# Patient Record
Sex: MALE | Race: WHITE | NOT HISPANIC OR LATINO | Employment: OTHER | URBAN - METROPOLITAN AREA
[De-identification: names, ages, dates, MRNs, and addresses within clinical notes are randomized per-mention and may not be internally consistent; named-entity substitution may affect disease eponyms.]

---

## 2025-04-12 ENCOUNTER — HOSPITAL ENCOUNTER (INPATIENT)
Facility: HOSPITAL | Age: 75
DRG: 682 | End: 2025-04-12
Attending: STUDENT IN AN ORGANIZED HEALTH CARE EDUCATION/TRAINING PROGRAM | Admitting: INTERNAL MEDICINE
Payer: MEDICARE

## 2025-04-12 ENCOUNTER — APPOINTMENT (OUTPATIENT)
Dept: RADIOLOGY | Facility: HOSPITAL | Age: 75
DRG: 682 | End: 2025-04-12
Payer: MEDICARE

## 2025-04-12 ENCOUNTER — APPOINTMENT (OUTPATIENT)
Dept: CARDIOLOGY | Facility: HOSPITAL | Age: 75
DRG: 682 | End: 2025-04-12
Payer: MEDICARE

## 2025-04-12 DIAGNOSIS — N17.9 AKI (ACUTE KIDNEY INJURY): Primary | ICD-10-CM

## 2025-04-12 DIAGNOSIS — R19.7 DIARRHEA, UNSPECIFIED TYPE: ICD-10-CM

## 2025-04-12 PROBLEM — E86.1 HYPOVOLEMIA: Status: ACTIVE | Noted: 2025-04-12

## 2025-04-12 LAB
ALBUMIN SERPL BCP-MCNC: 5.4 G/DL (ref 3.4–5)
ALP SERPL-CCNC: 125 U/L (ref 33–136)
ALT SERPL W P-5'-P-CCNC: 51 U/L (ref 10–52)
ANION GAP BLDV CALCULATED.4IONS-SCNC: 19 MMOL/L (ref 10–25)
ANION GAP SERPL CALC-SCNC: 32 MMOL/L (ref 10–20)
AST SERPL W P-5'-P-CCNC: 67 U/L (ref 9–39)
BASE EXCESS BLDV CALC-SCNC: -9.7 MMOL/L (ref -2–3)
BASOPHILS # BLD AUTO: 0.03 X10*3/UL (ref 0–0.1)
BASOPHILS NFR BLD AUTO: 0.2 %
BILIRUB SERPL-MCNC: 0.8 MG/DL (ref 0–1.2)
BODY TEMPERATURE: 37 DEGREES CELSIUS
BUN SERPL-MCNC: 71 MG/DL (ref 6–23)
CA-I BLDV-SCNC: 0.96 MMOL/L (ref 1.1–1.33)
CALCIUM SERPL-MCNC: 10.5 MG/DL (ref 8.6–10.3)
CHLORIDE BLDV-SCNC: 103 MMOL/L (ref 98–107)
CHLORIDE SERPL-SCNC: 95 MMOL/L (ref 98–107)
CO2 SERPL-SCNC: 13 MMOL/L (ref 21–32)
CREAT SERPL-MCNC: 5.88 MG/DL (ref 0.5–1.3)
CRITICAL CALL TIME: 2006
CRITICAL CALLED BY: ABNORMAL
CRITICAL CALLED TO: ABNORMAL
CRITICAL READ BACK: ABNORMAL
EGFRCR SERPLBLD CKD-EPI 2021: 9 ML/MIN/1.73M*2
EOSINOPHIL # BLD AUTO: 0.01 X10*3/UL (ref 0–0.4)
EOSINOPHIL NFR BLD AUTO: 0.1 %
ERYTHROCYTE [DISTWIDTH] IN BLOOD BY AUTOMATED COUNT: 13.3 % (ref 11.5–14.5)
GLUCOSE BLDV-MCNC: 114 MG/DL (ref 74–99)
GLUCOSE SERPL-MCNC: 139 MG/DL (ref 74–99)
HCO3 BLDV-SCNC: 13.3 MMOL/L (ref 22–26)
HCT VFR BLD AUTO: 52.1 % (ref 41–52)
HCT VFR BLD EST: 47 % (ref 41–52)
HGB BLD-MCNC: 16.4 G/DL (ref 13.5–17.5)
HGB BLDV-MCNC: 15.8 G/DL (ref 13.5–17.5)
HOLD SPECIMEN: NORMAL
IMM GRANULOCYTES # BLD AUTO: 0.11 X10*3/UL (ref 0–0.5)
IMM GRANULOCYTES NFR BLD AUTO: 0.7 % (ref 0–0.9)
INHALED O2 CONCENTRATION: 21 %
LACTATE BLDV-SCNC: 2.2 MMOL/L (ref 0.4–2)
LIPASE SERPL-CCNC: 45 U/L (ref 9–82)
LYMPHOCYTES # BLD AUTO: 0.99 X10*3/UL (ref 0.8–3)
LYMPHOCYTES NFR BLD AUTO: 6.6 %
MAGNESIUM SERPL-MCNC: 2.18 MG/DL (ref 1.6–2.4)
MCH RBC QN AUTO: 30.7 PG (ref 26–34)
MCHC RBC AUTO-ENTMCNC: 31.5 G/DL (ref 32–36)
MCV RBC AUTO: 97 FL (ref 80–100)
MONOCYTES # BLD AUTO: 1.49 X10*3/UL (ref 0.05–0.8)
MONOCYTES NFR BLD AUTO: 9.9 %
NEUTROPHILS # BLD AUTO: 12.39 X10*3/UL (ref 1.6–5.5)
NEUTROPHILS NFR BLD AUTO: 82.5 %
NRBC BLD-RTO: 0 /100 WBCS (ref 0–0)
OXYHGB MFR BLDV: 91.9 % (ref 45–75)
PCO2 BLDV: 23 MM HG (ref 41–51)
PH BLDV: 7.37 PH (ref 7.33–7.43)
PLATELET # BLD AUTO: 296 X10*3/UL (ref 150–450)
PO2 BLDV: 69 MM HG (ref 35–45)
POTASSIUM BLDV-SCNC: 8.1 MMOL/L (ref 3.5–5.3)
POTASSIUM SERPL-SCNC: 4.5 MMOL/L (ref 3.5–5.3)
PROT SERPL-MCNC: 9.8 G/DL (ref 6.4–8.2)
RBC # BLD AUTO: 5.35 X10*6/UL (ref 4.5–5.9)
SAO2 % BLDV: 96 % (ref 45–75)
SODIUM BLDV-SCNC: 127 MMOL/L (ref 136–145)
SODIUM SERPL-SCNC: 135 MMOL/L (ref 136–145)
WBC # BLD AUTO: 15 X10*3/UL (ref 4.4–11.3)

## 2025-04-12 PROCEDURE — 83735 ASSAY OF MAGNESIUM: CPT | Performed by: STUDENT IN AN ORGANIZED HEALTH CARE EDUCATION/TRAINING PROGRAM

## 2025-04-12 PROCEDURE — 82435 ASSAY OF BLOOD CHLORIDE: CPT | Performed by: STUDENT IN AN ORGANIZED HEALTH CARE EDUCATION/TRAINING PROGRAM

## 2025-04-12 PROCEDURE — 80053 COMPREHEN METABOLIC PANEL: CPT | Performed by: STUDENT IN AN ORGANIZED HEALTH CARE EDUCATION/TRAINING PROGRAM

## 2025-04-12 PROCEDURE — 99223 1ST HOSP IP/OBS HIGH 75: CPT | Performed by: INTERNAL MEDICINE

## 2025-04-12 PROCEDURE — 96360 HYDRATION IV INFUSION INIT: CPT

## 2025-04-12 PROCEDURE — 36415 COLL VENOUS BLD VENIPUNCTURE: CPT | Performed by: STUDENT IN AN ORGANIZED HEALTH CARE EDUCATION/TRAINING PROGRAM

## 2025-04-12 PROCEDURE — 85025 COMPLETE CBC W/AUTO DIFF WBC: CPT | Performed by: STUDENT IN AN ORGANIZED HEALTH CARE EDUCATION/TRAINING PROGRAM

## 2025-04-12 PROCEDURE — 74176 CT ABD & PELVIS W/O CONTRAST: CPT | Performed by: RADIOLOGY

## 2025-04-12 PROCEDURE — 74176 CT ABD & PELVIS W/O CONTRAST: CPT

## 2025-04-12 PROCEDURE — 2500000004 HC RX 250 GENERAL PHARMACY W/ HCPCS (ALT 636 FOR OP/ED): Performed by: STUDENT IN AN ORGANIZED HEALTH CARE EDUCATION/TRAINING PROGRAM

## 2025-04-12 PROCEDURE — 96361 HYDRATE IV INFUSION ADD-ON: CPT

## 2025-04-12 PROCEDURE — 99285 EMERGENCY DEPT VISIT HI MDM: CPT | Mod: 25 | Performed by: STUDENT IN AN ORGANIZED HEALTH CARE EDUCATION/TRAINING PROGRAM

## 2025-04-12 PROCEDURE — 2060000001 HC INTERMEDIATE ICU ROOM DAILY

## 2025-04-12 PROCEDURE — 83690 ASSAY OF LIPASE: CPT | Performed by: STUDENT IN AN ORGANIZED HEALTH CARE EDUCATION/TRAINING PROGRAM

## 2025-04-12 PROCEDURE — 93005 ELECTROCARDIOGRAM TRACING: CPT

## 2025-04-12 RX ORDER — TAMSULOSIN HYDROCHLORIDE 0.4 MG/1
0.4 CAPSULE ORAL EVERY MORNING
Status: DISCONTINUED | OUTPATIENT
Start: 2025-04-13 | End: 2025-04-19 | Stop reason: HOSPADM

## 2025-04-12 RX ORDER — OMEPRAZOLE 20 MG/1
20 CAPSULE, DELAYED RELEASE ORAL NIGHTLY
COMMUNITY

## 2025-04-12 RX ORDER — ARIPIPRAZOLE 2 MG/1
2 TABLET ORAL DAILY
COMMUNITY

## 2025-04-12 RX ORDER — ARIPIPRAZOLE 2 MG/1
2 TABLET ORAL DAILY
Status: DISCONTINUED | OUTPATIENT
Start: 2025-04-13 | End: 2025-04-19 | Stop reason: HOSPADM

## 2025-04-12 RX ORDER — TAMSULOSIN HYDROCHLORIDE 0.4 MG/1
0.4 CAPSULE ORAL EVERY MORNING
COMMUNITY

## 2025-04-12 RX ORDER — SODIUM CHLORIDE, SODIUM LACTATE, POTASSIUM CHLORIDE, CALCIUM CHLORIDE 600; 310; 30; 20 MG/100ML; MG/100ML; MG/100ML; MG/100ML
125 INJECTION, SOLUTION INTRAVENOUS CONTINUOUS
Status: ACTIVE | OUTPATIENT
Start: 2025-04-12 | End: 2025-04-14

## 2025-04-12 RX ORDER — ERGOCALCIFEROL 1.25 MG/1
1.25 CAPSULE ORAL 2 TIMES WEEKLY
COMMUNITY

## 2025-04-12 RX ORDER — HEPARIN SODIUM 5000 [USP'U]/ML
5000 INJECTION, SOLUTION INTRAVENOUS; SUBCUTANEOUS EVERY 8 HOURS SCHEDULED
Status: DISCONTINUED | OUTPATIENT
Start: 2025-04-12 | End: 2025-04-19 | Stop reason: HOSPADM

## 2025-04-12 RX ORDER — ACETAMINOPHEN 650 MG/1
650 SUPPOSITORY RECTAL EVERY 4 HOURS PRN
Status: DISCONTINUED | OUTPATIENT
Start: 2025-04-12 | End: 2025-04-19 | Stop reason: HOSPADM

## 2025-04-12 RX ORDER — ACETAMINOPHEN 325 MG/1
650 TABLET ORAL EVERY 4 HOURS PRN
Status: DISCONTINUED | OUTPATIENT
Start: 2025-04-12 | End: 2025-04-19 | Stop reason: HOSPADM

## 2025-04-12 RX ORDER — MIRTAZAPINE 15 MG/1
15 TABLET, FILM COATED ORAL NIGHTLY
Status: DISCONTINUED | OUTPATIENT
Start: 2025-04-12 | End: 2025-04-19 | Stop reason: HOSPADM

## 2025-04-12 RX ORDER — ACETAMINOPHEN 160 MG/5ML
650 SOLUTION ORAL EVERY 4 HOURS PRN
Status: DISCONTINUED | OUTPATIENT
Start: 2025-04-12 | End: 2025-04-19 | Stop reason: HOSPADM

## 2025-04-12 RX ORDER — PANTOPRAZOLE SODIUM 40 MG/1
40 TABLET, DELAYED RELEASE ORAL
Status: DISCONTINUED | OUTPATIENT
Start: 2025-04-13 | End: 2025-04-19 | Stop reason: HOSPADM

## 2025-04-12 RX ORDER — PAROXETINE HYDROCHLORIDE 40 MG/1
40 TABLET, FILM COATED ORAL EVERY MORNING
COMMUNITY
Start: 2024-11-27

## 2025-04-12 RX ORDER — VIT C/E/ZN/COPPR/LUTEIN/ZEAXAN 250MG-90MG
1 CAPSULE ORAL 2 TIMES WEEKLY
COMMUNITY

## 2025-04-12 RX ORDER — ONDANSETRON HYDROCHLORIDE 2 MG/ML
4 INJECTION, SOLUTION INTRAVENOUS EVERY 8 HOURS PRN
Status: DISCONTINUED | OUTPATIENT
Start: 2025-04-12 | End: 2025-04-19 | Stop reason: HOSPADM

## 2025-04-12 RX ORDER — PAROXETINE HYDROCHLORIDE 20 MG/1
40 TABLET, FILM COATED ORAL EVERY MORNING
Status: DISCONTINUED | OUTPATIENT
Start: 2025-04-13 | End: 2025-04-19 | Stop reason: HOSPADM

## 2025-04-12 RX ORDER — MIRTAZAPINE 15 MG/1
15 TABLET, FILM COATED ORAL NIGHTLY
COMMUNITY

## 2025-04-12 RX ORDER — ONDANSETRON 4 MG/1
4 TABLET, FILM COATED ORAL EVERY 8 HOURS PRN
Status: DISCONTINUED | OUTPATIENT
Start: 2025-04-12 | End: 2025-04-19 | Stop reason: HOSPADM

## 2025-04-12 RX ADMIN — SODIUM CHLORIDE, SODIUM LACTATE, POTASSIUM CHLORIDE, AND CALCIUM CHLORIDE 1000 ML: .6; .31; .03; .02 INJECTION, SOLUTION INTRAVENOUS at 16:29

## 2025-04-12 RX ADMIN — SODIUM CHLORIDE, SODIUM LACTATE, POTASSIUM CHLORIDE, AND CALCIUM CHLORIDE 1000 ML: .6; .31; .03; .02 INJECTION, SOLUTION INTRAVENOUS at 19:58

## 2025-04-12 SDOH — SOCIAL STABILITY: SOCIAL INSECURITY: DO YOU FEEL UNSAFE GOING BACK TO THE PLACE WHERE YOU ARE LIVING?: NO

## 2025-04-12 SDOH — ECONOMIC STABILITY: HOUSING INSECURITY: IN THE LAST 12 MONTHS, WAS THERE A TIME WHEN YOU WERE NOT ABLE TO PAY THE MORTGAGE OR RENT ON TIME?: NO

## 2025-04-12 SDOH — ECONOMIC STABILITY: TRANSPORTATION INSECURITY: IN THE PAST 12 MONTHS, HAS LACK OF TRANSPORTATION KEPT YOU FROM MEDICAL APPOINTMENTS OR FROM GETTING MEDICATIONS?: NO

## 2025-04-12 SDOH — SOCIAL STABILITY: SOCIAL INSECURITY
WITHIN THE LAST YEAR, HAVE YOU BEEN RAPED OR FORCED TO HAVE ANY KIND OF SEXUAL ACTIVITY BY YOUR PARTNER OR EX-PARTNER?: NO

## 2025-04-12 SDOH — SOCIAL STABILITY: SOCIAL INSECURITY: WITHIN THE LAST YEAR, HAVE YOU BEEN HUMILIATED OR EMOTIONALLY ABUSED IN OTHER WAYS BY YOUR PARTNER OR EX-PARTNER?: NO

## 2025-04-12 SDOH — ECONOMIC STABILITY: HOUSING INSECURITY: AT ANY TIME IN THE PAST 12 MONTHS, WERE YOU HOMELESS OR LIVING IN A SHELTER (INCLUDING NOW)?: NO

## 2025-04-12 SDOH — SOCIAL STABILITY: SOCIAL INSECURITY
WITHIN THE LAST YEAR, HAVE YOU BEEN KICKED, HIT, SLAPPED, OR OTHERWISE PHYSICALLY HURT BY YOUR PARTNER OR EX-PARTNER?: NO

## 2025-04-12 SDOH — ECONOMIC STABILITY: FOOD INSECURITY: WITHIN THE PAST 12 MONTHS, YOU WORRIED THAT YOUR FOOD WOULD RUN OUT BEFORE YOU GOT THE MONEY TO BUY MORE.: NEVER TRUE

## 2025-04-12 SDOH — ECONOMIC STABILITY: FOOD INSECURITY: WITHIN THE PAST 12 MONTHS, THE FOOD YOU BOUGHT JUST DIDN'T LAST AND YOU DIDN'T HAVE MONEY TO GET MORE.: NEVER TRUE

## 2025-04-12 SDOH — SOCIAL STABILITY: SOCIAL INSECURITY: WITHIN THE LAST YEAR, HAVE YOU BEEN AFRAID OF YOUR PARTNER OR EX-PARTNER?: NO

## 2025-04-12 SDOH — SOCIAL STABILITY: SOCIAL INSECURITY: ARE THERE ANY APPARENT SIGNS OF INJURIES/BEHAVIORS THAT COULD BE RELATED TO ABUSE/NEGLECT?: NO

## 2025-04-12 SDOH — SOCIAL STABILITY: SOCIAL INSECURITY: DOES ANYONE TRY TO KEEP YOU FROM HAVING/CONTACTING OTHER FRIENDS OR DOING THINGS OUTSIDE YOUR HOME?: NO

## 2025-04-12 SDOH — SOCIAL STABILITY: SOCIAL INSECURITY: ARE YOU OR HAVE YOU BEEN THREATENED OR ABUSED PHYSICALLY, EMOTIONALLY, OR SEXUALLY BY ANYONE?: NO

## 2025-04-12 SDOH — ECONOMIC STABILITY: INCOME INSECURITY: IN THE PAST 12 MONTHS HAS THE ELECTRIC, GAS, OIL, OR WATER COMPANY THREATENED TO SHUT OFF SERVICES IN YOUR HOME?: NO

## 2025-04-12 SDOH — SOCIAL STABILITY: SOCIAL INSECURITY: HAS ANYONE EVER THREATENED TO HURT YOUR FAMILY OR YOUR PETS?: NO

## 2025-04-12 SDOH — ECONOMIC STABILITY: HOUSING INSECURITY: IN THE PAST 12 MONTHS, HOW MANY TIMES HAVE YOU MOVED WHERE YOU WERE LIVING?: 0

## 2025-04-12 SDOH — SOCIAL STABILITY: SOCIAL INSECURITY: ABUSE: ADULT

## 2025-04-12 SDOH — SOCIAL STABILITY: SOCIAL INSECURITY: WERE YOU ABLE TO COMPLETE ALL THE BEHAVIORAL HEALTH SCREENINGS?: YES

## 2025-04-12 SDOH — ECONOMIC STABILITY: FOOD INSECURITY: HOW HARD IS IT FOR YOU TO PAY FOR THE VERY BASICS LIKE FOOD, HOUSING, MEDICAL CARE, AND HEATING?: NOT HARD AT ALL

## 2025-04-12 SDOH — SOCIAL STABILITY: SOCIAL INSECURITY: DO YOU FEEL ANYONE HAS EXPLOITED OR TAKEN ADVANTAGE OF YOU FINANCIALLY OR OF YOUR PERSONAL PROPERTY?: NO

## 2025-04-12 SDOH — SOCIAL STABILITY: SOCIAL INSECURITY: HAVE YOU HAD ANY THOUGHTS OF HARMING ANYONE ELSE?: NO

## 2025-04-12 SDOH — SOCIAL STABILITY: SOCIAL INSECURITY: HAVE YOU HAD THOUGHTS OF HARMING ANYONE ELSE?: NO

## 2025-04-12 ASSESSMENT — ACTIVITIES OF DAILY LIVING (ADL)
TOILETING: INDEPENDENT
HEARING - RIGHT EAR: DIFFICULTY WITH NOISE
LACK_OF_TRANSPORTATION: NO
ADEQUATE_TO_COMPLETE_ADL: YES
HEARING - LEFT EAR: DIFFICULTY WITH NOISE
LACK_OF_TRANSPORTATION: NO
GROOMING: INDEPENDENT
DRESSING YOURSELF: INDEPENDENT
FEEDING YOURSELF: INDEPENDENT
JUDGMENT_ADEQUATE_SAFELY_COMPLETE_DAILY_ACTIVITIES: YES
PATIENT'S MEMORY ADEQUATE TO SAFELY COMPLETE DAILY ACTIVITIES?: YES
BATHING: INDEPENDENT
WALKS IN HOME: INDEPENDENT

## 2025-04-12 ASSESSMENT — LIFESTYLE VARIABLES
HAVE YOU EVER FELT YOU SHOULD CUT DOWN ON YOUR DRINKING: NO
EVER HAD A DRINK FIRST THING IN THE MORNING TO STEADY YOUR NERVES TO GET RID OF A HANGOVER: NO
HOW OFTEN DO YOU HAVE 6 OR MORE DRINKS ON ONE OCCASION: NEVER
SKIP TO QUESTIONS 9-10: 1
PRESCIPTION_ABUSE_PAST_12_MONTHS: NO
AUDIT-C TOTAL SCORE: 0
SUBSTANCE_ABUSE_PAST_12_MONTHS: NO
HOW OFTEN DO YOU HAVE A DRINK CONTAINING ALCOHOL: NEVER
HOW MANY STANDARD DRINKS CONTAINING ALCOHOL DO YOU HAVE ON A TYPICAL DAY: PATIENT DOES NOT DRINK
AUDIT-C TOTAL SCORE: 0
EVER FELT BAD OR GUILTY ABOUT YOUR DRINKING: NO
HAVE PEOPLE ANNOYED YOU BY CRITICIZING YOUR DRINKING: NO
TOTAL SCORE: 0

## 2025-04-12 ASSESSMENT — COGNITIVE AND FUNCTIONAL STATUS - GENERAL
DAILY ACTIVITIY SCORE: 24
PATIENT BASELINE BEDBOUND: NO
MOBILITY SCORE: 24

## 2025-04-12 ASSESSMENT — PATIENT HEALTH QUESTIONNAIRE - PHQ9
2. FEELING DOWN, DEPRESSED OR HOPELESS: NOT AT ALL
1. LITTLE INTEREST OR PLEASURE IN DOING THINGS: NOT AT ALL
SUM OF ALL RESPONSES TO PHQ9 QUESTIONS 1 & 2: 0

## 2025-04-12 ASSESSMENT — ENCOUNTER SYMPTOMS
HEADACHES: 0
SHORTNESS OF BREATH: 0
WHEEZING: 0
APPETITE CHANGE: 1
CONFUSION: 0
ABDOMINAL PAIN: 0
DIFFICULTY URINATING: 1
VOMITING: 0
DIZZINESS: 0
CONSTIPATION: 0
NAUSEA: 0
MYALGIAS: 0
PALPITATIONS: 0
DYSURIA: 0
HEMATURIA: 0
WOUND: 0
FEVER: 0
DIARRHEA: 1
CHILLS: 0
COUGH: 0

## 2025-04-12 ASSESSMENT — PAIN - FUNCTIONAL ASSESSMENT: PAIN_FUNCTIONAL_ASSESSMENT: 0-10

## 2025-04-12 ASSESSMENT — PAIN SCALES - GENERAL: PAINLEVEL_OUTOF10: 0 - NO PAIN

## 2025-04-12 NOTE — PROGRESS NOTES
Pharmacy Medication History Review    Antoine Kimball is a 74 y.o. male admitted for No Principal Problem: There is no principal problem currently on the Problem List. Please update the Problem List and refresh.. Pharmacy reviewed the patient's [id number] medications and allergies for accuracy.    The list below reflectives the updated PTA list. Please review each medication in order reconciliation for additional clarification and justification.  Prior to Admission medications    Medication Sig Start Date End Date Taking? Authorizing Provider   ARIPiprazole (Abilify) 2 mg tablet Take 1 tablet (2 mg) by mouth once daily.   Yes Historical Provider, MD   cyanocobalamin, vitamin B-12, 2,500 mcg tablet, sublingual SL tablet Place 1 tablet (2,500 mcg) under the tongue 2 times a week. Every Wednesday and Friday   Yes Historical Provider, MD   ergocalciferol (Vitamin D-2) 1250 mcg (50,000 units) capsule Take 1 capsule (1.25 mg) by mouth 2 times a week. Every Thursday and Sunday   Yes Historical Provider, MD   mirtazapine (Remeron) 15 mg tablet Take 1 tablet (15 mg) by mouth once daily at bedtime.   Yes Historical Provider, MD   multivit-min/folic acid/lutein (CENTRUM SILVER ORAL) Take 1 tablet by mouth once daily.   Yes Historical Provider, MD   omeprazole (PriLOSEC) 20 mg DR capsule Take 1 capsule (20 mg) by mouth once daily at bedtime.   Yes Historical Provider, MD   PARoxetine (Paxil) 40 mg tablet Take 1 tablet (40 mg) by mouth once daily in the morning. 11/27/24  Yes Historical Provider, MD   tamsulosin (Flomax) 0.4 mg 24 hr capsule Take 1 capsule (0.4 mg) by mouth once daily in the morning.   Yes Historical Provider, MD        The list below reflectives the updated allergy list. Please review each documented allergy for additional clarification and justification.  Allergies  Reviewed by Jazz Calixto RN on 4/12/2025   No Known Allergies         Below are additional concerns with the patient's PTA  salomon.      Erika Molina

## 2025-04-12 NOTE — ED TRIAGE NOTES
Pt presents to ED with c/o diarrhea x 1 week. Pt states he has been unable to eat or drink anything without diarrhea. Pt denies fever, chills, + nausea. Pt hypotensive during triage, states he normally runs low.

## 2025-04-12 NOTE — ED PROVIDER NOTES
History of Present Illness     History provided by: Patient  Limitations to History: None  External Records Reviewed with Brief Summary: Outpatient progress note from 1/13/25 which showed urology visit for psa screening    HPI:  Antoine Kimball is a 74 y.o. male with a past medical history of Crohn's s/p ileostomy who presents with diarrhea.  Patient notes that starting since Wednesday he developed numerous episodes of diarrhea.  States he changed his bag over 20 times yesterday.  Notes having some lightheadedness and feels dehydrated.  No shortness breath.  No chest pain.  No nausea or vomiting.  No dysuria or poly.  Denies recent antibiotic use.    Physical Exam   Triage vitals:  T 36.7 °C (98.1 °F)  HR (!) 107  BP (!) 86/46  RR 18  O2 98 % None (Room air)    General: Well appearing and in no acute distress.  HEENT: NCAT. PERRL. Oropharynx pink and dry.  CV: Regular rate, regular rhythm.   Resp: Normal effort.   GI: Soft. Mild RLQ TTP without rebound or guarding.  Extremities: No lower extremity edema. 2+ radial pulses bilaterally.  Neuro: Moving all extremities bilaterally.  Psych: Appropriate mood and affect    Medical Decision Making & ED Course   Medical Decision Making:  This is a 74 y.o. male with a past medical history of Crohn's s/p ileostomy who presents with diarrhea.  Patient having numerous episodes of diarrhea starting since Wednesday.  On exam he does appear clinically dry.  He is tachycardic and mildly hypotensive.  Labs obtained here showing acute kidney injury.  Bicarb is low and so we will obtain a gas to further delineate this.  CT here showing no acute abnormalities.  His tenderness is mostly in the right lower quadrant so I am less concerned about gallbladder pathology.  Tachycardia improved with IV fluids.  Will give another liter.  Case discussed with the medicine service and the patient was admitted for further management.  ----    Differential diagnoses considered include but are not  limited to: viral enteritis, colitis, alfred, hypokalemia, acidosis     Social Determinants of Health which Significantly Impact Care: None identified     EKG Independent Interpretation:  EKG interpreted by me shows normal sinus rhythm with a left axis deviation, normal intervals and no acute ischemic changes    Independent Result Review and Interpretation: Relevant laboratory and radiographic results were reviewed and independently interpreted by myself.  As necessary, they are commented on in the ED Course.    Chronic conditions affecting the patient's care: As documented above in [facility]    The patient was discussed with the following consultants/services: Hospitalist/Admitting Provider who accepted the patient for admission    Care Considerations: As documented above in [facility]    ED Course:  Diagnoses as of 04/12/25 1920   ALFRED (acute kidney injury)   Diarrhea, unspecified type     Disposition   As a result of their workup, the patient will require admission to the hospital.  The patient was informed of his diagnosis.  The patient was given the opportunity to ask questions and I answered them. The patient agreed to be admitted to the hospital.    Fernandez Elliott MD  Emergency Medicine     Fernandez Elliott MD  04/12/25 1924

## 2025-04-13 VITALS
SYSTOLIC BLOOD PRESSURE: 98 MMHG | TEMPERATURE: 97 F | HEIGHT: 71 IN | RESPIRATION RATE: 18 BRPM | OXYGEN SATURATION: 94 % | WEIGHT: 209 LBS | DIASTOLIC BLOOD PRESSURE: 59 MMHG | BODY MASS INDEX: 29.26 KG/M2 | HEART RATE: 73 BPM

## 2025-04-13 LAB
ANION GAP SERPL CALC-SCNC: 24 MMOL/L (ref 10–20)
BUN SERPL-MCNC: 83 MG/DL (ref 6–23)
C COLI+JEJ+UPSA DNA STL QL NAA+PROBE: NOT DETECTED
C DIF TOX TCDA+TCDB STL QL NAA+PROBE: NOT DETECTED
CALCIUM SERPL-MCNC: 9.3 MG/DL (ref 8.6–10.3)
CHLORIDE SERPL-SCNC: 99 MMOL/L (ref 98–107)
CO2 SERPL-SCNC: 18 MMOL/L (ref 21–32)
CREAT SERPL-MCNC: 4.95 MG/DL (ref 0.5–1.3)
CREAT UR-MCNC: 259.5 MG/DL (ref 20–370)
CRP SERPL-MCNC: 2.21 MG/DL
EC STX1 GENE STL QL NAA+PROBE: NOT DETECTED
EC STX2 GENE STL QL NAA+PROBE: NOT DETECTED
EGFRCR SERPLBLD CKD-EPI 2021: 12 ML/MIN/1.73M*2
ERYTHROCYTE [DISTWIDTH] IN BLOOD BY AUTOMATED COUNT: 13.2 % (ref 11.5–14.5)
ERYTHROCYTE [SEDIMENTATION RATE] IN BLOOD BY WESTERGREN METHOD: 88 MM/H (ref 0–20)
GLUCOSE SERPL-MCNC: 87 MG/DL (ref 74–99)
HCT VFR BLD AUTO: 43.2 % (ref 41–52)
HGB BLD-MCNC: 14.1 G/DL (ref 13.5–17.5)
MCH RBC QN AUTO: 30.1 PG (ref 26–34)
MCHC RBC AUTO-ENTMCNC: 32.6 G/DL (ref 32–36)
MCV RBC AUTO: 92 FL (ref 80–100)
NOROVIRUS GI + GII RNA STL NAA+PROBE: NOT DETECTED
NRBC BLD-RTO: 0 /100 WBCS (ref 0–0)
PLATELET # BLD AUTO: 304 X10*3/UL (ref 150–450)
POTASSIUM SERPL-SCNC: 4.2 MMOL/L (ref 3.5–5.3)
RBC # BLD AUTO: 4.68 X10*6/UL (ref 4.5–5.9)
RV RNA STL NAA+PROBE: DETECTED
SALMONELLA DNA STL QL NAA+PROBE: NOT DETECTED
SHIGELLA DNA SPEC QL NAA+PROBE: NOT DETECTED
SODIUM SERPL-SCNC: 137 MMOL/L (ref 136–145)
SODIUM UR-SCNC: 12 MMOL/L
SODIUM/CREAT UR-RTO: 5 MMOL/G CREAT
V CHOLERAE DNA STL QL NAA+PROBE: NOT DETECTED
WBC # BLD AUTO: 10.9 X10*3/UL (ref 4.4–11.3)
Y ENTEROCOL DNA STL QL NAA+PROBE: NOT DETECTED

## 2025-04-13 PROCEDURE — 87493 C DIFF AMPLIFIED PROBE: CPT | Mod: PARLAB | Performed by: PHYSICIAN ASSISTANT

## 2025-04-13 PROCEDURE — 2500000002 HC RX 250 W HCPCS SELF ADMINISTERED DRUGS (ALT 637 FOR MEDICARE OP, ALT 636 FOR OP/ED): Performed by: INTERNAL MEDICINE

## 2025-04-13 PROCEDURE — 2500000002 HC RX 250 W HCPCS SELF ADMINISTERED DRUGS (ALT 637 FOR MEDICARE OP, ALT 636 FOR OP/ED): Performed by: FAMILY MEDICINE

## 2025-04-13 PROCEDURE — 86140 C-REACTIVE PROTEIN: CPT | Performed by: PHYSICIAN ASSISTANT

## 2025-04-13 PROCEDURE — 2500000001 HC RX 250 WO HCPCS SELF ADMINISTERED DRUGS (ALT 637 FOR MEDICARE OP): Performed by: INTERNAL MEDICINE

## 2025-04-13 PROCEDURE — 87506 IADNA-DNA/RNA PROBE TQ 6-11: CPT | Mod: PARLAB | Performed by: PHYSICIAN ASSISTANT

## 2025-04-13 PROCEDURE — 99233 SBSQ HOSP IP/OBS HIGH 50: CPT | Performed by: FAMILY MEDICINE

## 2025-04-13 PROCEDURE — 82570 ASSAY OF URINE CREATININE: CPT | Performed by: INTERNAL MEDICINE

## 2025-04-13 PROCEDURE — 99223 1ST HOSP IP/OBS HIGH 75: CPT | Performed by: PHYSICIAN ASSISTANT

## 2025-04-13 PROCEDURE — 2060000001 HC INTERMEDIATE ICU ROOM DAILY

## 2025-04-13 PROCEDURE — 2500000004 HC RX 250 GENERAL PHARMACY W/ HCPCS (ALT 636 FOR OP/ED): Performed by: INTERNAL MEDICINE

## 2025-04-13 PROCEDURE — 36415 COLL VENOUS BLD VENIPUNCTURE: CPT | Performed by: INTERNAL MEDICINE

## 2025-04-13 PROCEDURE — 80048 BASIC METABOLIC PNL TOTAL CA: CPT | Performed by: INTERNAL MEDICINE

## 2025-04-13 PROCEDURE — 85027 COMPLETE CBC AUTOMATED: CPT | Performed by: INTERNAL MEDICINE

## 2025-04-13 PROCEDURE — 85652 RBC SED RATE AUTOMATED: CPT | Performed by: PHYSICIAN ASSISTANT

## 2025-04-13 RX ORDER — TRAZODONE HYDROCHLORIDE 50 MG/1
50 TABLET ORAL NIGHTLY PRN
Status: DISCONTINUED | OUTPATIENT
Start: 2025-04-13 | End: 2025-04-19 | Stop reason: HOSPADM

## 2025-04-13 RX ADMIN — HEPARIN SODIUM 5000 UNITS: 5000 INJECTION INTRAVENOUS; SUBCUTANEOUS at 15:19

## 2025-04-13 RX ADMIN — TAMSULOSIN HYDROCHLORIDE 0.4 MG: 0.4 CAPSULE ORAL at 08:51

## 2025-04-13 RX ADMIN — HEPARIN SODIUM 5000 UNITS: 5000 INJECTION INTRAVENOUS; SUBCUTANEOUS at 06:37

## 2025-04-13 RX ADMIN — SODIUM CHLORIDE, SODIUM LACTATE, POTASSIUM CHLORIDE, AND CALCIUM CHLORIDE 125 ML/HR: .6; .31; .03; .02 INJECTION, SOLUTION INTRAVENOUS at 08:51

## 2025-04-13 RX ADMIN — SODIUM CHLORIDE, SODIUM LACTATE, POTASSIUM CHLORIDE, AND CALCIUM CHLORIDE 125 ML/HR: .6; .31; .03; .02 INJECTION, SOLUTION INTRAVENOUS at 16:55

## 2025-04-13 RX ADMIN — HEPARIN SODIUM 5000 UNITS: 5000 INJECTION INTRAVENOUS; SUBCUTANEOUS at 21:27

## 2025-04-13 RX ADMIN — SODIUM CHLORIDE, SODIUM LACTATE, POTASSIUM CHLORIDE, AND CALCIUM CHLORIDE 125 ML/HR: .6; .31; .03; .02 INJECTION, SOLUTION INTRAVENOUS at 00:42

## 2025-04-13 RX ADMIN — ARIPIPRAZOLE 2 MG: 2 TABLET ORAL at 08:51

## 2025-04-13 RX ADMIN — PAROXETINE HYDROCHLORIDE 40 MG: 20 TABLET, FILM COATED ORAL at 12:05

## 2025-04-13 RX ADMIN — TRAZODONE HYDROCHLORIDE 50 MG: 50 TABLET ORAL at 23:57

## 2025-04-13 RX ADMIN — PANTOPRAZOLE SODIUM 40 MG: 40 TABLET, DELAYED RELEASE ORAL at 06:36

## 2025-04-13 RX ADMIN — MIRTAZAPINE 15 MG: 15 TABLET, FILM COATED ORAL at 00:42

## 2025-04-13 ASSESSMENT — ENCOUNTER SYMPTOMS
SHORTNESS OF BREATH: 0
JOINT SWELLING: 0
WEAKNESS: 0
UNEXPECTED WEIGHT CHANGE: 0
TROUBLE SWALLOWING: 0
ARTHRALGIAS: 0
FEVER: 0
COUGH: 0
CHILLS: 0
WHEEZING: 0
DYSURIA: 0
APPETITE CHANGE: 0
SORE THROAT: 0
DIFFICULTY URINATING: 1
HEADACHES: 0
MYALGIAS: 0
HEMATURIA: 0
DIZZINESS: 0
NUMBNESS: 0
EYE PAIN: 0
CONFUSION: 0

## 2025-04-13 NOTE — CONSULTS
"Nephrology Consult Note    Reason For Consult  Elevated creatinine    History Of Present Illness  Antoine Kimball is a 74 y.o. male with PMH of Crohn disease status post ileostomy, BPH presenting with increased output from his ileostomy , found to have severe ALFRED    No prior similar episodes  Admits to poor po intake for days  Noted poor po intake  BP in the 80s in the ED  CT abd as below      Past Medical History  He has a past medical history of Personal history of other diseases of the digestive system (08/31/2020).    Surgical History  He has a past surgical history that includes Other surgical history (08/26/2020) and Other surgical history (08/31/2020).     Social History  He reports that he has never smoked. He has never used smokeless tobacco. He reports that he does not currently use alcohol. He reports that he does not use drugs.    Family History  No family history on file.     Allergies  Patient has no known allergies.    Review of Systems  Per HPI     Physical Exam    Vitals 24HR  Heart Rate:  []   Temp:  [36 °C (96.8 °F)-37.3 °C (99.1 °F)]   Resp:  [14-24]   BP: ()/(46-63)   Height:  [180.3 cm (5' 11\")]   Weight:  [68 kg (150 lb)-94.8 kg (209 lb)]   SpO2:  [91 %-98 %]        AAOx3  Tachycardic, murmur+  CTA BL  Abd soft, + BS, ileostomy +  No edema           I&O 24HR    Intake/Output Summary (Last 24 hours) at 4/13/2025 1204  Last data filed at 4/13/2025 0851  Gross per 24 hour   Intake 3918.75 ml   Output 1400 ml   Net 2518.75 ml       Scheduled medications  ARIPiprazole, 2 mg, oral, Daily  heparin (porcine), 5,000 Units, subcutaneous, q8h ERIN  mirtazapine, 15 mg, oral, Nightly  pantoprazole, 40 mg, oral, Daily before breakfast  PARoxetine, 40 mg, oral, q AM  tamsulosin, 0.4 mg, oral, q AM      Continuous medications  lactated Ringer's, 125 mL/hr, Last Rate: 125 mL/hr (04/13/25 0851)      PRN medications  PRN medications: acetaminophen **OR** acetaminophen **OR** acetaminophen, ondansetron " **OR** ondansetron    Relevant Results      Admission on 04/12/2025   Component Date Value Ref Range Status    WBC 04/12/2025 15.0 (H)  4.4 - 11.3 x10*3/uL Final    nRBC 04/12/2025 0.0  0.0 - 0.0 /100 WBCs Final    RBC 04/12/2025 5.35  4.50 - 5.90 x10*6/uL Final    Hemoglobin 04/12/2025 16.4  13.5 - 17.5 g/dL Final    Hematocrit 04/12/2025 52.1 (H)  41.0 - 52.0 % Final    MCV 04/12/2025 97  80 - 100 fL Final    MCH 04/12/2025 30.7  26.0 - 34.0 pg Final    MCHC 04/12/2025 31.5 (L)  32.0 - 36.0 g/dL Final    RDW 04/12/2025 13.3  11.5 - 14.5 % Final    Platelets 04/12/2025 296  150 - 450 x10*3/uL Final    Neutrophils % 04/12/2025 82.5  40.0 - 80.0 % Final    Immature Granulocytes %, Automated 04/12/2025 0.7  0.0 - 0.9 % Final    Immature Granulocyte Count (IG) includes promyelocytes, myelocytes and metamyelocytes but does not include bands. Percent differential counts (%) should be interpreted in the context of the absolute cell counts (cells/UL).    Lymphocytes % 04/12/2025 6.6  13.0 - 44.0 % Final    Monocytes % 04/12/2025 9.9  2.0 - 10.0 % Final    Eosinophils % 04/12/2025 0.1  0.0 - 6.0 % Final    Basophils % 04/12/2025 0.2  0.0 - 2.0 % Final    Neutrophils Absolute 04/12/2025 12.39 (H)  1.60 - 5.50 x10*3/uL Final    Percent differential counts (%) should be interpreted in the context of the absolute cell counts (cells/uL).    Immature Granulocytes Absolute, Au* 04/12/2025 0.11  0.00 - 0.50 x10*3/uL Final    Lymphocytes Absolute 04/12/2025 0.99  0.80 - 3.00 x10*3/uL Final    Monocytes Absolute 04/12/2025 1.49 (H)  0.05 - 0.80 x10*3/uL Final    Eosinophils Absolute 04/12/2025 0.01  0.00 - 0.40 x10*3/uL Final    Basophils Absolute 04/12/2025 0.03  0.00 - 0.10 x10*3/uL Final    Glucose 04/12/2025 139 (H)  74 - 99 mg/dL Final    Sodium 04/12/2025 135 (L)  136 - 145 mmol/L Final    Potassium 04/12/2025 4.5  3.5 - 5.3 mmol/L Final    MILD HEMOLYSIS DETECTED. The result may be falsely elevated due to hemolysis or other  interferents. Clinical correlation is recommended. Repeat testing may be considered.    Chloride 04/12/2025 95 (L)  98 - 107 mmol/L Final    Bicarbonate 04/12/2025 13 (L)  21 - 32 mmol/L Final    Anion Gap 04/12/2025 32 (H)  10 - 20 mmol/L Final    Urea Nitrogen 04/12/2025 71 (H)  6 - 23 mg/dL Final    Creatinine 04/12/2025 5.88 (H)  0.50 - 1.30 mg/dL Final    eGFR 04/12/2025 9 (L)  >60 mL/min/1.73m*2 Final    Calculations of estimated GFR are performed using the 2021 CKD-EPI Study Refit equation without the race variable for the IDMS-Traceable creatinine methods.  https://jasn.asnjournals.org/content/early/2021/09/22/ASN.3850107979    Calcium 04/12/2025 10.5 (H)  8.6 - 10.3 mg/dL Final    Albumin 04/12/2025 5.4 (H)  3.4 - 5.0 g/dL Final    Alkaline Phosphatase 04/12/2025 125  33 - 136 U/L Final    Total Protein 04/12/2025 9.8 (H)  6.4 - 8.2 g/dL Final    AST 04/12/2025 67 (H)  9 - 39 U/L Final    MILD HEMOLYSIS DETECTED. The result may be falsely elevated due to hemolysis or other interferents. Clinical correlation is recommended. Repeat testing may be considered.    Bilirubin, Total 04/12/2025 0.8  0.0 - 1.2 mg/dL Final    ALT 04/12/2025 51  10 - 52 U/L Final    Patients treated with Sulfasalazine may generate falsely decreased results for ALT.    Lipase 04/12/2025 45  9 - 82 U/L Final    Magnesium 04/12/2025 2.18  1.60 - 2.40 mg/dL Final    Extra Tube 04/12/2025 Hold for add-ons.   Final    Auto resulted.    POCT pH, Venous 04/12/2025 7.37  7.33 - 7.43 pH Final    POCT pCO2, Venous 04/12/2025 23 (L)  41 - 51 mm Hg Final    POCT pO2, Venous 04/12/2025 69 (H)  35 - 45 mm Hg Final    POCT SO2, Venous 04/12/2025 96 (H)  45 - 75 % Final    POCT Oxy Hemoglobin, Venous 04/12/2025 91.9 (H)  45.0 - 75.0 % Final    POCT Hematocrit Calculated, Venous 04/12/2025 47.0  41.0 - 52.0 % Final    POCT Sodium, Venous 04/12/2025 127 (L)  136 - 145 mmol/L Final    POCT Potassium, Venous 04/12/2025 8.1 (HH)  3.5 - 5.3 mmol/L Final     POCT Chloride, Venous 04/12/2025 103  98 - 107 mmol/L Final    POCT Ionized Calicum, Venous 04/12/2025 0.96 (L)  1.10 - 1.33 mmol/L Final    POCT Glucose, Venous 04/12/2025 114 (H)  74 - 99 mg/dL Final    POCT Lactate, Venous 04/12/2025 2.2 (H)  0.4 - 2.0 mmol/L Final    POCT Base Excess, Venous 04/12/2025 -9.7 (L)  -2.0 - 3.0 mmol/L Final    POCT HCO3 Calculated, Venous 04/12/2025 13.3 (L)  22.0 - 26.0 mmol/L Final    POCT Hemoglobin, Venous 04/12/2025 15.8  13.5 - 17.5 g/dL Final    POCT Anion Gap, Venous 04/12/2025 19.0  10.0 - 25.0 mmol/L Final    Patient Temperature 04/12/2025 37.0  degrees Celsius Final    FiO2 04/12/2025 21  % Final    Critical Called By 04/12/2025 REYNA FRANK RRT   Final    Critical Called To 04/12/2025 DR DELA CRUZ   Final    Critical Call Time 04/12/2025 2006   Final    Critical Read Back 04/12/2025 Y   Final    WBC 04/13/2025 10.9  4.4 - 11.3 x10*3/uL Final    nRBC 04/13/2025 0.0  0.0 - 0.0 /100 WBCs Final    RBC 04/13/2025 4.68  4.50 - 5.90 x10*6/uL Final    Hemoglobin 04/13/2025 14.1  13.5 - 17.5 g/dL Final    Hematocrit 04/13/2025 43.2  41.0 - 52.0 % Final    MCV 04/13/2025 92  80 - 100 fL Final    MCH 04/13/2025 30.1  26.0 - 34.0 pg Final    MCHC 04/13/2025 32.6  32.0 - 36.0 g/dL Final    RDW 04/13/2025 13.2  11.5 - 14.5 % Final    Platelets 04/13/2025 304  150 - 450 x10*3/uL Final    Glucose 04/13/2025 87  74 - 99 mg/dL Final    Sodium 04/13/2025 137  136 - 145 mmol/L Final    Potassium 04/13/2025 4.2  3.5 - 5.3 mmol/L Final    Chloride 04/13/2025 99  98 - 107 mmol/L Final    Bicarbonate 04/13/2025 18 (L)  21 - 32 mmol/L Final    Anion Gap 04/13/2025 24 (H)  10 - 20 mmol/L Final    Urea Nitrogen 04/13/2025 83 (H)  6 - 23 mg/dL Final    Creatinine 04/13/2025 4.95 (H)  0.50 - 1.30 mg/dL Final    eGFR 04/13/2025 12 (L)  >60 mL/min/1.73m*2 Final    Calculations of estimated GFR are performed using the 2021 CKD-EPI Study Refit equation without the race variable for the IDMS-Traceable  creatinine methods.  https://jasn.asnjournals.org/content/early/2021/09/22/ASN.9067557460    Calcium 04/13/2025 9.3  8.6 - 10.3 mg/dL Final    C-Reactive Protein 04/13/2025 2.21 (H)  <1.00 mg/dL Final    Sedimentation Rate 04/13/2025 88 (H)  0 - 20 mm/h Final      Imaging  CT abdomen pelvis wo IV contrast    Result Date: 4/12/2025  Cholelithiasis. Evaluation of the gallbladder is otherwise limited, and if the patient is experiencing right upper quadrant pain or there is otherwise concern for acute gallbladder pathology, ultrasound may be obtained for further evaluation.   Postsurgical change of prior bowel resection and right side ostomy. Large left inguinal hernia with herniation of multiple bowel loops. No evidence of bowel obstruction.   Large 4.4 cm calculus in the urinary bladder.   MACRO: None   Signed by: Parker Cooper 4/12/2025 6:26 PM Dictation workstation:   [id number]     Cardiology, Vascular, and Other Imaging  No other imaging results found for the past 7 days      Medications  Medications Prior to Admission   Medication Sig Dispense Refill Last Dose/Taking    ARIPiprazole (Abilify) 2 mg tablet Take 1 tablet (2 mg) by mouth once daily.   4/12/2025 Morning    cyanocobalamin, vitamin B-12, 2,500 mcg tablet, sublingual SL tablet Place 1 tablet (2,500 mcg) under the tongue 2 times a week. Every Wednesday and Friday 4/11/2025    ergocalciferol (Vitamin D-2) 1250 mcg (50,000 units) capsule Take 1 capsule (1.25 mg) by mouth 2 times a week. Every Thursday and Dread   4/10/2025    mirtazapine (Remeron) 15 mg tablet Take 1 tablet (15 mg) by mouth once daily at bedtime.   4/11/2025 Evening    multivit-min/folic acid/lutein (CENTRUM SILVER ORAL) Take 1 tablet by mouth once daily.   4/12/2025 Morning    omeprazole (PriLOSEC) 20 mg DR capsule Take 1 capsule (20 mg) by mouth once daily at bedtime.   4/11/2025 Evening    PARoxetine (Paxil) 40 mg tablet Take 1 tablet (40 mg) by mouth once daily in the morning.    4/12/2025 Morning    tamsulosin (Flomax) 0.4 mg 24 hr capsule Take 1 capsule (0.4 mg) by mouth once daily in the morning.   4/12/2025 Morning      Scheduled medications  ARIPiprazole, 2 mg, oral, Daily  heparin (porcine), 5,000 Units, subcutaneous, q8h ERIN  mirtazapine, 15 mg, oral, Nightly  pantoprazole, 40 mg, oral, Daily before breakfast  PARoxetine, 40 mg, oral, q AM  tamsulosin, 0.4 mg, oral, q AM      Continuous medications  lactated Ringer's, 125 mL/hr, Last Rate: 125 mL/hr (04/13/25 0851)      PRN medications  PRN medications: acetaminophen **OR** acetaminophen **OR** acetaminophen, ondansetron **OR** ondansetron    Assessment/Plan     ALFRED on CKD, baseline creatinine of 1.3, was 5.88 on admission, improving  Prerenal from high stoma output w/ s/o hemoconcentration on presentation    K stable  Mild HCa in setting of ALFRED  AGMA and NAGMA from gi losses    Hx of HTN here BP borderline low      Plan  - creatinine improving with ivf, continue today  - K stable  - resolved Hca  - noted high CRP, ESR  - large bladder calculus, check PVRs, continue daily flomax  - might need bowel regimen if stoma output does not improve      Thank you for the opportunity to assist in the care of this patient, please call with questions  Carley Garcia MD PhD

## 2025-04-13 NOTE — H&P
History Of Present Illness  Antoine Kimball is a 74 y.o. male PMHx Crohn disease status post ileostomy, BPH presented to [facility] emergency department for increased output from his ileostomy present for last couple days.  The output from his ileostomy is watery and copious, he has had empty his bag several times throughout the days.  He also admits to poor oral intake during this time and little to no urine output.  Denies any abdominal pain, fevers, chills, chest pain, palpitations, shortness of breath.  In the emergency department he is hypotensive blood pressure 86/46 and he is tachycardic heart rate 107 along with tachypnea respiratory rate 24.  Blood work significant for BUN 71, creatinine 5.88, bicarb 13.  WBC 15,000.  CT abdomen pelvis without contrast was ordered.     Past Medical History  Past Medical History:   Diagnosis Date    Personal history of other diseases of the digestive system 08/31/2020    History of Crohn's disease       Surgical History  Past Surgical History:   Procedure Laterality Date    OTHER SURGICAL HISTORY  08/26/2020    Pilonidal cyst removal    OTHER SURGICAL HISTORY  08/31/2020    Ileostomy        Social History  He has no history on file for tobacco use, alcohol use, and drug use.    Family History  No family history on file.     Allergies  Patient has no known allergies.    Review of Systems   Constitutional:  Positive for appetite change. Negative for chills and fever.   HENT:  Negative for congestion.    Eyes:  Negative for visual disturbance.   Respiratory:  Negative for cough, shortness of breath and wheezing.    Cardiovascular:  Negative for chest pain, palpitations and leg swelling.   Gastrointestinal:  Positive for diarrhea. Negative for abdominal pain, constipation, nausea and vomiting.   Genitourinary:  Positive for decreased urine volume and difficulty urinating. Negative for dysuria and hematuria.   Musculoskeletal:  Negative for myalgias.   Skin:  Negative for rash and  "wound.   Neurological:  Negative for dizziness, syncope and headaches.   Psychiatric/Behavioral:  Negative for confusion.    All other systems reviewed and are negative.       Physical Exam     GEN:  awake, alert, not in acute distress  HEENT:  normocephalic, atraumatic, PERRL, EOM intact, nares patent  Cardio:  tachycardic, murmur  Resp:  CTAB, no wheezing  Abd:  +BS, soft, nontender, ileostomy present  :  deferred  Neuro:  A&Ox3, CN2-12 grossly intact, no focal deficits  Msk:  no gross deformities, no joint effusions  Skin:  warm, dry, intact  Psych:  appropriate in mood and behavior     Last Recorded Vitals  Blood pressure 94/56, pulse 97, temperature 36.7 °C (98.1 °F), resp. rate (!) 23, height 1.803 m (5' 11\"), weight 68 kg (150 lb), SpO2 (!) 93%.    Relevant Results      Results for orders placed or performed during the hospital encounter of 04/12/25 (from the past 24 hours)   CBC and Auto Differential   Result Value Ref Range    WBC 15.0 (H) 4.4 - 11.3 x10*3/uL    nRBC 0.0 0.0 - 0.0 /100 WBCs    RBC 5.35 4.50 - 5.90 x10*6/uL    Hemoglobin 16.4 13.5 - 17.5 g/dL    Hematocrit 52.1 (H) 41.0 - 52.0 %    MCV 97 80 - 100 fL    MCH 30.7 26.0 - 34.0 pg    MCHC 31.5 (L) 32.0 - 36.0 g/dL    RDW 13.3 11.5 - 14.5 %    Platelets 296 150 - 450 x10*3/uL    Neutrophils % 82.5 40.0 - 80.0 %    Immature Granulocytes %, Automated 0.7 0.0 - 0.9 %    Lymphocytes % 6.6 13.0 - 44.0 %    Monocytes % 9.9 2.0 - 10.0 %    Eosinophils % 0.1 0.0 - 6.0 %    Basophils % 0.2 0.0 - 2.0 %    Neutrophils Absolute 12.39 (H) 1.60 - 5.50 x10*3/uL    Immature Granulocytes Absolute, Automated 0.11 0.00 - 0.50 x10*3/uL    Lymphocytes Absolute 0.99 0.80 - 3.00 x10*3/uL    Monocytes Absolute 1.49 (H) 0.05 - 0.80 x10*3/uL    Eosinophils Absolute 0.01 0.00 - 0.40 x10*3/uL    Basophils Absolute 0.03 0.00 - 0.10 x10*3/uL   Comprehensive metabolic panel   Result Value Ref Range    Glucose 139 (H) 74 - 99 mg/dL    Sodium 135 (L) 136 - 145 mmol/L    " Potassium 4.5 3.5 - 5.3 mmol/L    Chloride 95 (L) 98 - 107 mmol/L    Bicarbonate 13 (L) 21 - 32 mmol/L    Anion Gap 32 (H) 10 - 20 mmol/L    Urea Nitrogen 71 (H) 6 - 23 mg/dL    Creatinine 5.88 (H) 0.50 - 1.30 mg/dL    eGFR 9 (L) >60 mL/min/1.73m*2    Calcium 10.5 (H) 8.6 - 10.3 mg/dL    Albumin 5.4 (H) 3.4 - 5.0 g/dL    Alkaline Phosphatase 125 33 - 136 U/L    Total Protein 9.8 (H) 6.4 - 8.2 g/dL    AST 67 (H) 9 - 39 U/L    Bilirubin, Total 0.8 0.0 - 1.2 mg/dL    ALT 51 10 - 52 U/L   Lipase   Result Value Ref Range    Lipase 45 9 - 82 U/L   Magnesium   Result Value Ref Range    Magnesium 2.18 1.60 - 2.40 mg/dL   PST Top   Result Value Ref Range    Extra Tube Hold for add-ons.    BLOOD GAS VENOUS FULL PANEL   Result Value Ref Range    POCT pH, Venous 7.37 7.33 - 7.43 pH    POCT pCO2, Venous 23 (L) 41 - 51 mm Hg    POCT pO2, Venous 69 (H) 35 - 45 mm Hg    POCT SO2, Venous 96 (H) 45 - 75 %    POCT Oxy Hemoglobin, Venous 91.9 (H) 45.0 - 75.0 %    POCT Hematocrit Calculated, Venous 47.0 41.0 - 52.0 %    POCT Sodium, Venous 127 (L) 136 - 145 mmol/L    POCT Potassium, Venous 8.1 (HH) 3.5 - 5.3 mmol/L    POCT Chloride, Venous 103 98 - 107 mmol/L    POCT Ionized Calicum, Venous 0.96 (L) 1.10 - 1.33 mmol/L    POCT Glucose, Venous 114 (H) 74 - 99 mg/dL    POCT Lactate, Venous 2.2 (H) 0.4 - 2.0 mmol/L    POCT Base Excess, Venous -9.7 (L) -2.0 - 3.0 mmol/L    POCT HCO3 Calculated, Venous 13.3 (L) 22.0 - 26.0 mmol/L    POCT Hemoglobin, Venous 15.8 13.5 - 17.5 g/dL    POCT Anion Gap, Venous 19.0 10.0 - 25.0 mmol/L    Patient Temperature 37.0 degrees Celsius    FiO2 21 %    Critical Called By REYNA FRANK RRT     Critical Called To DR DELA CRUZ     Critical Call Time 2006     Critical Read Back Y      CT abdomen pelvis wo IV contrast    Result Date: 4/12/2025  Interpreted By:  Parker Cooper, STUDY: CT ABDOMEN PELVIS WO IV CONTRAST;  4/12/2025 5:45 pm   INDICATION: Signs/Symptoms:rlq pain, severe diarrhea.   COMPARISON: None.   ACCESSION  NUMBER(S): [id number]   ORDERING CLINICIAN: MONTY DELA CRUZ   TECHNIQUE: Contiguous axial images of the abdomen and pelvis were obtained without intravenous contrast. Coronal and sagittal reformatted images were obtained from the axial images.   FINDINGS: Mild basilar atelectasis No pleural effusion.   Evaluation of the abdominal viscera is limited secondary to lack of intravenous contrast. Limited evaluation for liver mass on noncontrast examination. Calcified granuloma in the liver. There is cholelithiasis. Evaluation the gallbladder is otherwise limited on the noncontrast examination. No significant dilatation of the bile duct.   The pancreas and spleen appear unremarkable.   Mild nodularity of the left adrenal gland. The right adrenal gland appears unremarkable.   Small nonobstructive left renal calculus. Subcentimeter hypodensities too small to characterize. No hydronephrosis. Evaluation of the kidneys is otherwise limited secondary lack of intravenous contrast.   10 mm peripherally calcified splenic artery aneurysm.   There is postsurgical change of prior bowel resection and right side ostomy. No evidence of bowel obstruction. There is large left inguinal hernia with herniation of bowel.   4.4 cm large calculus in the urinary bladder. Urinary bladder is underdistended and not well evaluated.   Postsurgical change with surgical clips in the midline abdominal and pelvic wall.   Multilevel degenerative change of the lumbar spine.       Cholelithiasis. Evaluation of the gallbladder is otherwise limited, and if the patient is experiencing right upper quadrant pain or there is otherwise concern for acute gallbladder pathology, ultrasound may be obtained for further evaluation.   Postsurgical change of prior bowel resection and right side ostomy. Large left inguinal hernia with herniation of multiple bowel loops. No evidence of bowel obstruction.   Large 4.4 cm calculus in the urinary bladder.   MACRO: None   Signed  by: Parker  4/12/2025 6:26 PM Dictation workstation:   [id number]        Assessment/Plan   Assessment & Plan  ALFRED (acute kidney injury)    Hypovolemia    # Hypovolemia  # Acute renal failure, prerenal likely ATN  # Metabolic acidosis 2/2 renal failure  # Diarrhea  # SIRS without sepsis  # Large bladder calculus    H/o Crohn disease s/p ileostomy  BPH    Admit and treat patient for acute renal, previous creatinine was 1.34.  I suspect acute renal failure is prerenal due to hypovolemia from increased output from his ileostomy.  CT abdomen pelvis reviewed, this showed cholelithiasis without evidence for acute cholecystitis.  There is a large 4.4 cm calculus in the urinary bladder.  Metabolic acidosis secondary to acute renal failure, venous blood gas obtained and his pH is 7.37.  Will administer lactated Ringer's @125ml/hr, monitor strict intake and output, trend his renal function and electrolytes.  Will place consult to nephrology for additional recommendations.  Patient meets SIRS criteria, however I believe this is from hypovolemia and dehydration.  Will monitor for any fevers and if concerns for infection increases will start renally dosed Zosyn.  There as a very large bladder calculus 44mm.  Will consult urology if anything needs to be done, if it could be obstructing urinary outflow.  DVT prophylaxis with heparin subcutaneous.       I spent 60 minutes in the professional and overall care of this patient.      Jemal Vidal DO

## 2025-04-13 NOTE — PROGRESS NOTES
Antoine Kimball is a 74 y.o. male on day 1 of admission presenting with ALFRED (acute kidney injury).      Subjective   Continues to have profuse output from the ostomy     Objective     Last Recorded Vitals  /55 (BP Location: Right arm, Patient Position: Lying)   Pulse 82   Temp 36 °C (96.8 °F) (Temporal)   Resp 18   Wt 94.8 kg (209 lb)   SpO2 93%   Intake/Output last 3 Shifts:    Intake/Output Summary (Last 24 hours) at 4/13/2025 1115  Last data filed at 4/13/2025 0851  Gross per 24 hour   Intake 3918.75 ml   Output 1400 ml   Net 2518.75 ml       Admission Weight  Weight: 68 kg (150 lb) (04/12/25 1558)    Daily Weight  04/12/25 : 94.8 kg (209 lb)    Image Results  CT abdomen pelvis wo IV contrast  Narrative: Interpreted By:  Parker Cooper,   STUDY:  CT ABDOMEN PELVIS WO IV CONTRAST;  4/12/2025 5:45 pm      INDICATION:  Signs/Symptoms:rlq pain, severe diarrhea.      COMPARISON:  None.      ACCESSION NUMBER(S):  [id number]      ORDERING CLINICIAN:  MONTY DELA CRUZ      TECHNIQUE:  Contiguous axial images of the abdomen and pelvis were obtained  without intravenous contrast. Coronal and sagittal reformatted images  were obtained from the axial images.      FINDINGS:  Mild basilar atelectasis  No pleural effusion.      Evaluation of the abdominal viscera is limited secondary to lack of  intravenous contrast. Limited evaluation for liver mass on  noncontrast examination. Calcified granuloma in the liver. There is  cholelithiasis. Evaluation the gallbladder is otherwise limited on  the noncontrast examination. No significant dilatation of the bile  duct.      The pancreas and spleen appear unremarkable.      Mild nodularity of the left adrenal gland.  The right adrenal gland appears unremarkable.      Small nonobstructive left renal calculus. Subcentimeter hypodensities  too small to characterize. No hydronephrosis. Evaluation of the  kidneys is otherwise limited secondary lack of intravenous contrast.      10 mm  peripherally calcified splenic artery aneurysm.      There is postsurgical change of prior bowel resection and right side  ostomy. No evidence of bowel obstruction. There is large left  inguinal hernia with herniation of bowel.      4.4 cm large calculus in the urinary bladder. Urinary bladder is  underdistended and not well evaluated.      Postsurgical change with surgical clips in the midline abdominal and  pelvic wall.      Multilevel degenerative change of the lumbar spine.      Impression: Cholelithiasis. Evaluation of the gallbladder is otherwise limited,  and if the patient is experiencing right upper quadrant pain or there  is otherwise concern for acute gallbladder pathology, ultrasound may  be obtained for further evaluation.      Postsurgical change of prior bowel resection and right side ostomy.  Large left inguinal hernia with herniation of multiple bowel loops.  No evidence of bowel obstruction.      Large 4.4 cm calculus in the urinary bladder.      MACRO:  None      Signed by: Parker Cooper 4/12/2025 6:26 PM  Dictation workstation:   [id number]      Physical Exam  Alert oriented x 3  Lungs clear to auscultation bilaterally  Heart regular rhythm  Abdomen soft nontender, ileostomy bag  Extremities no leg edema  CNS no focal deficit    Relevant Results  Results for orders placed or performed during the hospital encounter of 04/12/25 (from the past 24 hours)   CBC and Auto Differential   Result Value Ref Range    WBC 15.0 (H) 4.4 - 11.3 x10*3/uL    nRBC 0.0 0.0 - 0.0 /100 WBCs    RBC 5.35 4.50 - 5.90 x10*6/uL    Hemoglobin 16.4 13.5 - 17.5 g/dL    Hematocrit 52.1 (H) 41.0 - 52.0 %    MCV 97 80 - 100 fL    MCH 30.7 26.0 - 34.0 pg    MCHC 31.5 (L) 32.0 - 36.0 g/dL    RDW 13.3 11.5 - 14.5 %    Platelets 296 150 - 450 x10*3/uL    Neutrophils % 82.5 40.0 - 80.0 %    Immature Granulocytes %, Automated 0.7 0.0 - 0.9 %    Lymphocytes % 6.6 13.0 - 44.0 %    Monocytes % 9.9 2.0 - 10.0 %    Eosinophils % 0.1 0.0 -  6.0 %    Basophils % 0.2 0.0 - 2.0 %    Neutrophils Absolute 12.39 (H) 1.60 - 5.50 x10*3/uL    Immature Granulocytes Absolute, Automated 0.11 0.00 - 0.50 x10*3/uL    Lymphocytes Absolute 0.99 0.80 - 3.00 x10*3/uL    Monocytes Absolute 1.49 (H) 0.05 - 0.80 x10*3/uL    Eosinophils Absolute 0.01 0.00 - 0.40 x10*3/uL    Basophils Absolute 0.03 0.00 - 0.10 x10*3/uL   Comprehensive metabolic panel   Result Value Ref Range    Glucose 139 (H) 74 - 99 mg/dL    Sodium 135 (L) 136 - 145 mmol/L    Potassium 4.5 3.5 - 5.3 mmol/L    Chloride 95 (L) 98 - 107 mmol/L    Bicarbonate 13 (L) 21 - 32 mmol/L    Anion Gap 32 (H) 10 - 20 mmol/L    Urea Nitrogen 71 (H) 6 - 23 mg/dL    Creatinine 5.88 (H) 0.50 - 1.30 mg/dL    eGFR 9 (L) >60 mL/min/1.73m*2    Calcium 10.5 (H) 8.6 - 10.3 mg/dL    Albumin 5.4 (H) 3.4 - 5.0 g/dL    Alkaline Phosphatase 125 33 - 136 U/L    Total Protein 9.8 (H) 6.4 - 8.2 g/dL    AST 67 (H) 9 - 39 U/L    Bilirubin, Total 0.8 0.0 - 1.2 mg/dL    ALT 51 10 - 52 U/L   Lipase   Result Value Ref Range    Lipase 45 9 - 82 U/L   Magnesium   Result Value Ref Range    Magnesium 2.18 1.60 - 2.40 mg/dL   PST Top   Result Value Ref Range    Extra Tube Hold for add-ons.    BLOOD GAS VENOUS FULL PANEL   Result Value Ref Range    POCT pH, Venous 7.37 7.33 - 7.43 pH    POCT pCO2, Venous 23 (L) 41 - 51 mm Hg    POCT pO2, Venous 69 (H) 35 - 45 mm Hg    POCT SO2, Venous 96 (H) 45 - 75 %    POCT Oxy Hemoglobin, Venous 91.9 (H) 45.0 - 75.0 %    POCT Hematocrit Calculated, Venous 47.0 41.0 - 52.0 %    POCT Sodium, Venous 127 (L) 136 - 145 mmol/L    POCT Potassium, Venous 8.1 (HH) 3.5 - 5.3 mmol/L    POCT Chloride, Venous 103 98 - 107 mmol/L    POCT Ionized Calicum, Venous 0.96 (L) 1.10 - 1.33 mmol/L    POCT Glucose, Venous 114 (H) 74 - 99 mg/dL    POCT Lactate, Venous 2.2 (H) 0.4 - 2.0 mmol/L    POCT Base Excess, Venous -9.7 (L) -2.0 - 3.0 mmol/L    POCT HCO3 Calculated, Venous 13.3 (L) 22.0 - 26.0 mmol/L    POCT Hemoglobin, Venous 15.8  13.5 - 17.5 g/dL    POCT Anion Gap, Venous 19.0 10.0 - 25.0 mmol/L    Patient Temperature 37.0 degrees Celsius    FiO2 21 %    Critical Called By REYNA FRANK RRT     Critical Called To DR DELA CRUZ     Critical Call Time 2006     Critical Read Back Y    CBC   Result Value Ref Range    WBC 10.9 4.4 - 11.3 x10*3/uL    nRBC 0.0 0.0 - 0.0 /100 WBCs    RBC 4.68 4.50 - 5.90 x10*6/uL    Hemoglobin 14.1 13.5 - 17.5 g/dL    Hematocrit 43.2 41.0 - 52.0 %    MCV 92 80 - 100 fL    MCH 30.1 26.0 - 34.0 pg    MCHC 32.6 32.0 - 36.0 g/dL    RDW 13.2 11.5 - 14.5 %    Platelets 304 150 - 450 x10*3/uL   Basic metabolic panel   Result Value Ref Range    Glucose 87 74 - 99 mg/dL    Sodium 137 136 - 145 mmol/L    Potassium 4.2 3.5 - 5.3 mmol/L    Chloride 99 98 - 107 mmol/L    Bicarbonate 18 (L) 21 - 32 mmol/L    Anion Gap 24 (H) 10 - 20 mmol/L    Urea Nitrogen 83 (H) 6 - 23 mg/dL    Creatinine 4.95 (H) 0.50 - 1.30 mg/dL    eGFR 12 (L) >60 mL/min/1.73m*2    Calcium 9.3 8.6 - 10.3 mg/dL   C-reactive protein   Result Value Ref Range    C-Reactive Protein 2.21 (H) <1.00 mg/dL     CT abdomen pelvis wo IV contrast    Result Date: 4/12/2025  Interpreted By:  Parker Cooper, STUDY: CT ABDOMEN PELVIS WO IV CONTRAST;  4/12/2025 5:45 pm   INDICATION: Signs/Symptoms:rlq pain, severe diarrhea.   COMPARISON: None.   ACCESSION NUMBER(S): [id number]   ORDERING CLINICIAN: MONTY DELA CRUZ   TECHNIQUE: Contiguous axial images of the abdomen and pelvis were obtained without intravenous contrast. Coronal and sagittal reformatted images were obtained from the axial images.   FINDINGS: Mild basilar atelectasis No pleural effusion.   Evaluation of the abdominal viscera is limited secondary to lack of intravenous contrast. Limited evaluation for liver mass on noncontrast examination. Calcified granuloma in the liver. There is cholelithiasis. Evaluation the gallbladder is otherwise limited on the noncontrast examination. No significant dilatation of the bile duct.    The pancreas and spleen appear unremarkable.   Mild nodularity of the left adrenal gland. The right adrenal gland appears unremarkable.   Small nonobstructive left renal calculus. Subcentimeter hypodensities too small to characterize. No hydronephrosis. Evaluation of the kidneys is otherwise limited secondary lack of intravenous contrast.   10 mm peripherally calcified splenic artery aneurysm.   There is postsurgical change of prior bowel resection and right side ostomy. No evidence of bowel obstruction. There is large left inguinal hernia with herniation of bowel.   4.4 cm large calculus in the urinary bladder. Urinary bladder is underdistended and not well evaluated.   Postsurgical change with surgical clips in the midline abdominal and pelvic wall.   Multilevel degenerative change of the lumbar spine.       Cholelithiasis. Evaluation of the gallbladder is otherwise limited, and if the patient is experiencing right upper quadrant pain or there is otherwise concern for acute gallbladder pathology, ultrasound may be obtained for further evaluation.   Postsurgical change of prior bowel resection and right side ostomy. Large left inguinal hernia with herniation of multiple bowel loops. No evidence of bowel obstruction.   Large 4.4 cm calculus in the urinary bladder.   MACRO: None   Signed by: Parker Cooper 4/12/2025 6:26 PM Dictation workstation:   [id number]       Assessment/Plan   # Hypovolemia  # Acute renal failure, prerenal likely ATN  # Metabolic acidosis 2/2 renal failure  # Diarrhea  # SIRS without sepsis  # Large bladder calculus  # H/o Crohn disease s/p ileostomy  # BPH     Plan:  Serum creatinine slowly trending down  Continue IV hydration.  Consult GI  Reviewed urology recommendations, the patient will follow-up with his established urologist, no indication for urgent urologic intervention  Monitor electrolytes  DVT prophylaxis    Frnaca Pritchard MD

## 2025-04-13 NOTE — CONSULTS
Department of Internal Medicine  Gastroenterology  Consult note      Reason for Consult: Crohn's disease, increase stoma output    Chief Complaint: Diarrhea and weakness    History Obtained from: chart review and patient reports    History of Present Illness      The patient is a 74 y.o. male with signficant past medical history of Crohn's disease s/p proctocolectomy and BPH with ileostomy who presents for chorea and weakness.    Patient states on Wednesday he started having mild nausea and increased watery output.  He said for short period he was emptying his bag about 4 times an hour for 1 hour which is maybe Friday.  He states the frequency has slowed down but is still is stayed very watery.    He denies abdominal pain, vomiting, odynophagia, dysphagia, acid reflux, constipation, melena, or hematochezia.  He endorses some mild nausea without vomiting.  He is unable to elicit how often he typically changes his bag.    Denies use of tobacco, alcohol, and recreational drugs. No blood thinner or recent changes in medication. Denies use of daily NSAIDs and Tylenol.  He denies any recent travel or going out to eat anywhere.  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    He states his last EGD was within the last 10 years for sore stomach at [facility].  He has not had a colonoscopy or ileoscopy.    He had a proctocolectomy in 1977 after being diagnosed with Crohn's in 1967.  Stomal revision in 1987.  He has never been on any medications for his Crohn's.  He states he was cured of his Crohn's and has not been following with GI.    Allergies  Patient has no known allergies.    Current Medication    Current Facility-Administered Medications:     acetaminophen (Tylenol) tablet 650 mg, 650 mg, oral, q4h PRN **OR** acetaminophen (Tylenol) oral liquid 650 mg, 650 mg, oral, q4h PRN **OR** acetaminophen (Tylenol) suppository 650 mg, 650 mg,  rectal, q4h PRN, Jemal Vidal DO    ARIPiprazole (Abilify) tablet 2 mg, 2 mg, oral, Daily, Jemal Vidal DO    heparin (porcine) injection 5,000 Units, 5,000 Units, subcutaneous, q8h ERIN, Jemal Vidal, DO, 5,000 Units at 04/13/25 0637    lactated Ringer's infusion, 125 mL/hr, intravenous, Continuous, Jemal Vidal DO, Last Rate: 125 mL/hr at 04/13/25 0851, 125 mL/hr at 04/13/25 0851    mirtazapine (Remeron) tablet 15 mg, 15 mg, oral, Nightly, Jemal Vidal DO, 15 mg at 04/13/25 0042    ondansetron (Zofran) tablet 4 mg, 4 mg, oral, q8h PRN **OR** ondansetron (Zofran) injection 4 mg, 4 mg, intravenous, q8h PRN, Jemal Vidal DO    pantoprazole (ProtoNix) EC tablet 40 mg, 40 mg, oral, Daily before breakfast, Jemal Vidal DO, 40 mg at 04/13/25 0636    PARoxetine (Paxil) tablet 40 mg, 40 mg, oral, q AM, Franca Pritchard MD    tamsulosin (Flomax) 24 hr capsule 0.4 mg, 0.4 mg, oral, q AM, Jemal Vidal DO, 0.4 mg at 04/13/25 0851    Past Medical History  Active Ambulatory Problems     Diagnosis Date Noted    No Active Ambulatory Problems     Resolved Ambulatory Problems     Diagnosis Date Noted    No Resolved Ambulatory Problems     Past Medical History:   Diagnosis Date    Personal history of other diseases of the digestive system 08/31/2020       Past Surgical History  Past Surgical History:   Procedure Laterality Date    OTHER SURGICAL HISTORY  08/26/2020    Pilonidal cyst removal    OTHER SURGICAL HISTORY  08/31/2020    Ileostomy       Family History  No family history on file.  No family history of colon cancer.  Family history of stomach cancer in his paternal grandfather.  Unknown age.    Social History  TOBACCO:  reports that he has never smoked. He has never used smokeless tobacco.  ETOH:  reports that he does not currently use alcohol.  DRUGS:  reports no history of drug use.  MARITAL STATUS:   OCCUPATION:    Review of Systems  Review of Systems   Constitutional:  Negative for appetite change, chills, fever and unexpected  "weight change.   HENT:  Negative for mouth sores, sore throat and trouble swallowing.    Eyes:  Negative for pain and visual disturbance.   Respiratory:  Negative for cough, shortness of breath and wheezing.    Cardiovascular:  Negative for chest pain.   Genitourinary:  Positive for difficulty urinating. Negative for decreased urine volume, dysuria and hematuria.   Musculoskeletal:  Negative for arthralgias, joint swelling and myalgias.   Skin:  Negative for pallor and rash.   Neurological:  Negative for dizziness, weakness, numbness and headaches.   Psychiatric/Behavioral:  Negative for confusion.        PHYSICAL EXAM  VS: /55 (BP Location: Right arm, Patient Position: Lying)   Pulse 82   Temp 36 °C (96.8 °F) (Temporal)   Resp 18   Ht 1.803 m (5' 11\")   Wt 94.8 kg (209 lb)   SpO2 93%   BMI 29.15 kg/m²  Body mass index is 29.15 kg/m².  Physical Exam  Constitutional:       General: He is not in acute distress.     Appearance: Normal appearance.   HENT:      Head: Normocephalic and atraumatic.      Mouth/Throat:      Mouth: Mucous membranes are moist.      Pharynx: Oropharynx is clear.   Eyes:      General: No scleral icterus.     Extraocular Movements: Extraocular movements intact.      Conjunctiva/sclera: Conjunctivae normal.      Pupils: Pupils are equal, round, and reactive to light.   Cardiovascular:      Rate and Rhythm: Normal rate and regular rhythm.      Heart sounds: No murmur heard.  Pulmonary:      Effort: Pulmonary effort is normal.      Breath sounds: No wheezing or rhonchi.   Abdominal:      General: Bowel sounds are normal. There is no distension.      Palpations: Abdomen is soft. There is no mass.      Tenderness: There is no abdominal tenderness.      Hernia: No hernia is present.      Comments: Ostomy in right lower quadrant, excoriated skin around ostomy due to tape   Musculoskeletal:         General: No swelling or deformity.   Skin:     General: Skin is warm.      Coloration: Skin is " not jaundiced.   Neurological:      General: No focal deficit present.      Mental Status: He is alert and oriented to person, place, and time.   Psychiatric:         Mood and Affect: Mood normal.          DATA  Recent blood work and relevant radiology studies were reviewed and discussed with the patient   Results from last 7 days   Lab Units 04/13/25  0532   WBC AUTO x10*3/uL 10.9   RBC AUTO x10*6/uL 4.68   HEMOGLOBIN g/dL 14.1   HEMATOCRIT % 43.2   MCV fL 92   MCHC g/dL 32.6   RDW % 13.2   PLATELETS AUTO x10*3/uL 304       Results from last 72 hours   Lab Units 04/13/25  0532 04/12/25  1626   SODIUM mmol/L 137 135*   POTASSIUM mmol/L 4.2 4.5   CHLORIDE mmol/L 99 95*   CO2 mmol/L 18* 13*   BUN mg/dL 83* 71*   CREATININE mg/dL 4.95* 5.88*   CALCIUM mg/dL 9.3 10.5*   PROTEIN TOTAL g/dL  --  9.8*   BILIRUBIN TOTAL mg/dL  --  0.8   ALK PHOS U/L  --  125   AST U/L  --  67*   ALT U/L  --  51             Results from last 72 hours   Lab Units 04/12/25  1626   LIPASE U/L 45       RADIOLOGY REVIEW  CT abdomen pelvis without contrast 4/12/2025  Cholelithiasis. Evaluation of the gallbladder is otherwise limited, and if the patient is experiencing right upper quadrant pain or there is otherwise concern for acute gallbladder pathology, ultrasound may be obtained for further evaluation.      Postsurgical change of prior bowel resection and right side ostomy.  Large left inguinal hernia with herniation of multiple bowel loops.  No evidence of bowel obstruction.      Large 4.4 cm calculus in the urinary bladder.    ENDOSCOPIC REVIEW  NA    IMPRESSION/RECOMMENDATIONS  The patient is a 74 y.o. male with signficant past medical history of Crohn's disease s/p proctocolectomy and BPH with ileostomy who presents for chorea and weakness.    Diarrhea -unclear etiology, possibly infectious in etiology  ALFRED  History of Crohn's status post proctoscopy ileocolectomy 1977 with stomal revision in 1987    PLAN  -Recommend stool pathogen and C.  difficile for further evaluation of diarrhea  -Recommend checking CRP and sed rate  -Currently pantoprazole 40 mg daily  -Currently on a general diet  -Supportive care per primary team    Discussed with Dr Nugent GI to follow      (Electronically signed byAlice Wells PA-C on 4/13/2025 at 10:23 AM)    Inpatient consult to Gastroenterology  Consult performed by: Alice Wells PA-C  Consult ordered by: Franca Pritchard MD

## 2025-04-13 NOTE — CARE PLAN
Problem: Pain  Goal: Takes deep breaths with improved pain control throughout the shift  Outcome: Progressing  Goal: Turns in bed with improved pain control throughout the shift  Outcome: Progressing  Goal: Walks with improved pain control throughout the shift  Outcome: Progressing  Goal: Performs ADL's with improved pain control throughout shift  Outcome: Progressing

## 2025-04-13 NOTE — CONSULTS
Consults    Reason For Consult  Patient has a history of Crohn's disease is admitted with GI problems and diarrhea.  He was found to have a approximately 4 cm stone in the bladder.    History Of Present Illness  Antoine Kimball is a 74 y.o. male presenting with exacerbation of Crohn's disease and diarrhea does have an ileostomy     Past Medical History  He has a past medical history of Personal history of other diseases of the digestive system (08/31/2020).    Surgical History  He has a past surgical history that includes Other surgical history (08/26/2020) and Other surgical history (08/31/2020).     Social History  He reports that he has never smoked. He has never used smokeless tobacco. He reports that he does not currently use alcohol. He reports that he does not use drugs.    Family History  No family history on file.     Allergies  Patient has no known allergies.    Review of Systems    REVIEW OF SYSTEMS  GENERAL:  Negative for malaise, significant weight loss, fever  HEENT:  No changes in hearing or vision, no nose bleeds or other nasal problems and Negative for frequent or significant headaches  NECK:  Negative for lumps, goiter, pain and significant neck swelling  RESPIRATORY:  Negative for cough, wheezing and shortness of breath  CARDIOVASCULAR:  Negative for chest pain, leg swelling and palpitations  GI:  Negative for abdominal discomfort, blood in stools or black stools and change in bowel habits  :  Negative for dysuria, frequency and incontinence  MUSCULOSKELETAL:  Negative for joint pain or swelling, back pain, and muscle pain.  SKIN:  Negative for lesions, rash, and itching  PSYCH:  Negative for sleep disturbance, mood disorder and recent psychosocial stressors  HEMATOLOGY/LYMPHOLOGY:  Negative for prolonged bleeding, bruising easily, and swollen nodes.  ENDOCRINE:  Negative for cold or heat intolerance, polyuria, polydipsia and goiter  NEURO: Negative, denies any burning, tingling or numbness  "    Objective:   Vasc: DP and PT pulses are palpable bilateral.  CFT is less than 3 seconds bilateral.  Skin temperature is warm to cool proximal to distal bilateral.      Neuro:  Light touch is intact to the foot bilateral.  Protective sensation is intact to the foot when tested with the 5.07 SWM bilateral.  There is no clonus noted.  The hallux is downgoing bilateral.      Derm: Nails 1-5 bilateral are intact.  Skin is supple with normal texture and turgor noted.  Webspaces are clean, dry and intact bilateral.  There are no hyperkeratoses, ulcerations, verruca or other lesions noted.      Ortho: Muscle strength is 5/5 for all pedal groups tested.  Ankle joint, subtalar joint, 1st MPJ and lesser MPJ ROM is full and without pain or crepitus.  The foot type is rectus bilateral off weight bearing.  There are no structural deformities noted.       Physical Exam     Last Recorded Vitals  Blood pressure 103/55, pulse 82, temperature 36 °C (96.8 °F), temperature source Temporal, resp. rate 18, height 1.803 m (5' 11\"), weight 94.8 kg (209 lb), SpO2 93%.    Relevant Results  Results for orders placed or performed during the hospital encounter of 04/12/25 (from the past 24 hours)   CBC and Auto Differential   Result Value Ref Range    WBC 15.0 (H) 4.4 - 11.3 x10*3/uL    nRBC 0.0 0.0 - 0.0 /100 WBCs    RBC 5.35 4.50 - 5.90 x10*6/uL    Hemoglobin 16.4 13.5 - 17.5 g/dL    Hematocrit 52.1 (H) 41.0 - 52.0 %    MCV 97 80 - 100 fL    MCH 30.7 26.0 - 34.0 pg    MCHC 31.5 (L) 32.0 - 36.0 g/dL    RDW 13.3 11.5 - 14.5 %    Platelets 296 150 - 450 x10*3/uL    Neutrophils % 82.5 40.0 - 80.0 %    Immature Granulocytes %, Automated 0.7 0.0 - 0.9 %    Lymphocytes % 6.6 13.0 - 44.0 %    Monocytes % 9.9 2.0 - 10.0 %    Eosinophils % 0.1 0.0 - 6.0 %    Basophils % 0.2 0.0 - 2.0 %    Neutrophils Absolute 12.39 (H) 1.60 - 5.50 x10*3/uL    Immature Granulocytes Absolute, Automated 0.11 0.00 - 0.50 x10*3/uL    Lymphocytes Absolute 0.99 0.80 - 3.00 " x10*3/uL    Monocytes Absolute 1.49 (H) 0.05 - 0.80 x10*3/uL    Eosinophils Absolute 0.01 0.00 - 0.40 x10*3/uL    Basophils Absolute 0.03 0.00 - 0.10 x10*3/uL   Comprehensive metabolic panel   Result Value Ref Range    Glucose 139 (H) 74 - 99 mg/dL    Sodium 135 (L) 136 - 145 mmol/L    Potassium 4.5 3.5 - 5.3 mmol/L    Chloride 95 (L) 98 - 107 mmol/L    Bicarbonate 13 (L) 21 - 32 mmol/L    Anion Gap 32 (H) 10 - 20 mmol/L    Urea Nitrogen 71 (H) 6 - 23 mg/dL    Creatinine 5.88 (H) 0.50 - 1.30 mg/dL    eGFR 9 (L) >60 mL/min/1.73m*2    Calcium 10.5 (H) 8.6 - 10.3 mg/dL    Albumin 5.4 (H) 3.4 - 5.0 g/dL    Alkaline Phosphatase 125 33 - 136 U/L    Total Protein 9.8 (H) 6.4 - 8.2 g/dL    AST 67 (H) 9 - 39 U/L    Bilirubin, Total 0.8 0.0 - 1.2 mg/dL    ALT 51 10 - 52 U/L   Lipase   Result Value Ref Range    Lipase 45 9 - 82 U/L   Magnesium   Result Value Ref Range    Magnesium 2.18 1.60 - 2.40 mg/dL   PST Top   Result Value Ref Range    Extra Tube Hold for add-ons.    BLOOD GAS VENOUS FULL PANEL   Result Value Ref Range    POCT pH, Venous 7.37 7.33 - 7.43 pH    POCT pCO2, Venous 23 (L) 41 - 51 mm Hg    POCT pO2, Venous 69 (H) 35 - 45 mm Hg    POCT SO2, Venous 96 (H) 45 - 75 %    POCT Oxy Hemoglobin, Venous 91.9 (H) 45.0 - 75.0 %    POCT Hematocrit Calculated, Venous 47.0 41.0 - 52.0 %    POCT Sodium, Venous 127 (L) 136 - 145 mmol/L    POCT Potassium, Venous 8.1 (HH) 3.5 - 5.3 mmol/L    POCT Chloride, Venous 103 98 - 107 mmol/L    POCT Ionized Calicum, Venous 0.96 (L) 1.10 - 1.33 mmol/L    POCT Glucose, Venous 114 (H) 74 - 99 mg/dL    POCT Lactate, Venous 2.2 (H) 0.4 - 2.0 mmol/L    POCT Base Excess, Venous -9.7 (L) -2.0 - 3.0 mmol/L    POCT HCO3 Calculated, Venous 13.3 (L) 22.0 - 26.0 mmol/L    POCT Hemoglobin, Venous 15.8 13.5 - 17.5 g/dL    POCT Anion Gap, Venous 19.0 10.0 - 25.0 mmol/L    Patient Temperature 37.0 degrees Celsius    FiO2 21 %    Critical Called By REYNA FRANK RRT     Critical Called To DR DELA CRUZ      Critical Call Time 2006     Critical Read Back Y    CBC   Result Value Ref Range    WBC 10.9 4.4 - 11.3 x10*3/uL    nRBC 0.0 0.0 - 0.0 /100 WBCs    RBC 4.68 4.50 - 5.90 x10*6/uL    Hemoglobin 14.1 13.5 - 17.5 g/dL    Hematocrit 43.2 41.0 - 52.0 %    MCV 92 80 - 100 fL    MCH 30.1 26.0 - 34.0 pg    MCHC 32.6 32.0 - 36.0 g/dL    RDW 13.2 11.5 - 14.5 %    Platelets 304 150 - 450 x10*3/uL   Basic metabolic panel   Result Value Ref Range    Glucose 87 74 - 99 mg/dL    Sodium 137 136 - 145 mmol/L    Potassium 4.2 3.5 - 5.3 mmol/L    Chloride 99 98 - 107 mmol/L    Bicarbonate 18 (L) 21 - 32 mmol/L    Anion Gap 24 (H) 10 - 20 mmol/L    Urea Nitrogen 83 (H) 6 - 23 mg/dL    Creatinine 4.95 (H) 0.50 - 1.30 mg/dL    eGFR 12 (L) >60 mL/min/1.73m*2    Calcium 9.3 8.6 - 10.3 mg/dL    CT abdomen pelvis wo IV contrast    Result Date: 4/12/2025  Interpreted By:  Parker Cooper, STUDY: CT ABDOMEN PELVIS WO IV CONTRAST;  4/12/2025 5:45 pm   INDICATION: Signs/Symptoms:rlq pain, severe diarrhea.   COMPARISON: None.   ACCESSION NUMBER(S): [id number]   ORDERING CLINICIAN: MONTY DELA CRUZ   TECHNIQUE: Contiguous axial images of the abdomen and pelvis were obtained without intravenous contrast. Coronal and sagittal reformatted images were obtained from the axial images.   FINDINGS: Mild basilar atelectasis No pleural effusion.   Evaluation of the abdominal viscera is limited secondary to lack of intravenous contrast. Limited evaluation for liver mass on noncontrast examination. Calcified granuloma in the liver. There is cholelithiasis. Evaluation the gallbladder is otherwise limited on the noncontrast examination. No significant dilatation of the bile duct.   The pancreas and spleen appear unremarkable.   Mild nodularity of the left adrenal gland. The right adrenal gland appears unremarkable.   Small nonobstructive left renal calculus. Subcentimeter hypodensities too small to characterize. No hydronephrosis. Evaluation of the kidneys is  otherwise limited secondary lack of intravenous contrast.   10 mm peripherally calcified splenic artery aneurysm.   There is postsurgical change of prior bowel resection and right side ostomy. No evidence of bowel obstruction. There is large left inguinal hernia with herniation of bowel.   4.4 cm large calculus in the urinary bladder. Urinary bladder is underdistended and not well evaluated.   Postsurgical change with surgical clips in the midline abdominal and pelvic wall.   Multilevel degenerative change of the lumbar spine.       Cholelithiasis. Evaluation of the gallbladder is otherwise limited, and if the patient is experiencing right upper quadrant pain or there is otherwise concern for acute gallbladder pathology, ultrasound may be obtained for further evaluation.   Postsurgical change of prior bowel resection and right side ostomy. Large left inguinal hernia with herniation of multiple bowel loops. No evidence of bowel obstruction.   Large 4.4 cm calculus in the urinary bladder.   MACRO: None   Signed by: Parker Cooper 4/12/2025 6:26 PM Dictation workstation:   [id number]       Assessment/Plan     Patient does have a urologist that he sees regularly at the [facility] says he was just seen in January Dr. Rollins week.  Plan no urologic intervention is needed at this time patient has been instructed to follow-up with his urologist as an outpatient    I spent 20 minutes in the professional and overall care of this patient.

## 2025-04-14 LAB
ANION GAP SERPL CALC-SCNC: 16 MMOL/L (ref 10–20)
BUN SERPL-MCNC: 80 MG/DL (ref 6–23)
CALCIUM SERPL-MCNC: 8.9 MG/DL (ref 8.6–10.3)
CHLORIDE SERPL-SCNC: 104 MMOL/L (ref 98–107)
CO2 SERPL-SCNC: 22 MMOL/L (ref 21–32)
CREAT SERPL-MCNC: 2.87 MG/DL (ref 0.5–1.3)
EGFRCR SERPLBLD CKD-EPI 2021: 22 ML/MIN/1.73M*2
ERYTHROCYTE [DISTWIDTH] IN BLOOD BY AUTOMATED COUNT: 13.3 % (ref 11.5–14.5)
GLUCOSE SERPL-MCNC: 97 MG/DL (ref 74–99)
HCT VFR BLD AUTO: 41.3 % (ref 41–52)
HGB BLD-MCNC: 13.3 G/DL (ref 13.5–17.5)
MCH RBC QN AUTO: 30.2 PG (ref 26–34)
MCHC RBC AUTO-ENTMCNC: 32.2 G/DL (ref 32–36)
MCV RBC AUTO: 94 FL (ref 80–100)
NRBC BLD-RTO: 0 /100 WBCS (ref 0–0)
PLATELET # BLD AUTO: 254 X10*3/UL (ref 150–450)
POTASSIUM SERPL-SCNC: 3.5 MMOL/L (ref 3.5–5.3)
RBC # BLD AUTO: 4.4 X10*6/UL (ref 4.5–5.9)
SODIUM SERPL-SCNC: 138 MMOL/L (ref 136–145)
WBC # BLD AUTO: 8.1 X10*3/UL (ref 4.4–11.3)

## 2025-04-14 PROCEDURE — 2500000004 HC RX 250 GENERAL PHARMACY W/ HCPCS (ALT 636 FOR OP/ED): Performed by: INTERNAL MEDICINE

## 2025-04-14 PROCEDURE — 36415 COLL VENOUS BLD VENIPUNCTURE: CPT | Performed by: FAMILY MEDICINE

## 2025-04-14 PROCEDURE — 85027 COMPLETE CBC AUTOMATED: CPT | Performed by: FAMILY MEDICINE

## 2025-04-14 PROCEDURE — 2060000001 HC INTERMEDIATE ICU ROOM DAILY

## 2025-04-14 PROCEDURE — 2500000002 HC RX 250 W HCPCS SELF ADMINISTERED DRUGS (ALT 637 FOR MEDICARE OP, ALT 636 FOR OP/ED): Performed by: INTERNAL MEDICINE

## 2025-04-14 PROCEDURE — 2500000001 HC RX 250 WO HCPCS SELF ADMINISTERED DRUGS (ALT 637 FOR MEDICARE OP): Performed by: INTERNAL MEDICINE

## 2025-04-14 PROCEDURE — 80048 BASIC METABOLIC PNL TOTAL CA: CPT | Performed by: FAMILY MEDICINE

## 2025-04-14 PROCEDURE — 99232 SBSQ HOSP IP/OBS MODERATE 35: CPT | Performed by: PHYSICIAN ASSISTANT

## 2025-04-14 PROCEDURE — 99232 SBSQ HOSP IP/OBS MODERATE 35: CPT | Performed by: INTERNAL MEDICINE

## 2025-04-14 PROCEDURE — 2500000002 HC RX 250 W HCPCS SELF ADMINISTERED DRUGS (ALT 637 FOR MEDICARE OP, ALT 636 FOR OP/ED): Performed by: FAMILY MEDICINE

## 2025-04-14 RX ORDER — SODIUM CHLORIDE 9 MG/ML
75 INJECTION, SOLUTION INTRAVENOUS CONTINUOUS
Status: ACTIVE | OUTPATIENT
Start: 2025-04-14 | End: 2025-04-15

## 2025-04-14 RX ADMIN — MIRTAZAPINE 15 MG: 15 TABLET, FILM COATED ORAL at 01:00

## 2025-04-14 RX ADMIN — PANTOPRAZOLE SODIUM 40 MG: 40 TABLET, DELAYED RELEASE ORAL at 06:00

## 2025-04-14 RX ADMIN — MIRTAZAPINE 15 MG: 15 TABLET, FILM COATED ORAL at 20:38

## 2025-04-14 RX ADMIN — SODIUM CHLORIDE 75 ML/HR: 9 INJECTION, SOLUTION INTRAVENOUS at 17:13

## 2025-04-14 RX ADMIN — HEPARIN SODIUM 5000 UNITS: 5000 INJECTION INTRAVENOUS; SUBCUTANEOUS at 05:56

## 2025-04-14 RX ADMIN — HEPARIN SODIUM 5000 UNITS: 5000 INJECTION INTRAVENOUS; SUBCUTANEOUS at 23:00

## 2025-04-14 RX ADMIN — HEPARIN SODIUM 5000 UNITS: 5000 INJECTION INTRAVENOUS; SUBCUTANEOUS at 17:13

## 2025-04-14 RX ADMIN — TRAZODONE HYDROCHLORIDE 50 MG: 50 TABLET ORAL at 23:00

## 2025-04-14 RX ADMIN — TAMSULOSIN HYDROCHLORIDE 0.4 MG: 0.4 CAPSULE ORAL at 09:00

## 2025-04-14 RX ADMIN — ARIPIPRAZOLE 2 MG: 2 TABLET ORAL at 09:00

## 2025-04-14 RX ADMIN — PAROXETINE HYDROCHLORIDE 40 MG: 20 TABLET, FILM COATED ORAL at 09:00

## 2025-04-14 ASSESSMENT — PAIN SCALES - GENERAL
PAINLEVEL_OUTOF10: 0 - NO PAIN
PAINLEVEL_OUTOF10: 0 - NO PAIN

## 2025-04-14 ASSESSMENT — COGNITIVE AND FUNCTIONAL STATUS - GENERAL
DAILY ACTIVITIY SCORE: 24
MOBILITY SCORE: 24

## 2025-04-14 NOTE — PROGRESS NOTES
Nephrology Consult Progress Note    Antoine Kimball is a 74 y.o. male on day 2 of admission presenting with ALFRED (acute kidney injury).      Subjective   No acute events overnight, improved stoma output, denies abd pain       Objective          Physical Exam    Vitals 24HR  Heart Rate:  [71-89]   Temp:  [36.1 °C (97 °F)-37 °C (98.6 °F)]   Resp:  [18]   BP: ()/(56-59)   SpO2:  [90 %-94 %]         AAOx3  Tachycardic, murmur+  CTA BL  Abd soft, + BS, ileostomy +  No edema           I&O 24HR    Intake/Output Summary (Last 24 hours) at 4/14/2025 1115  Last data filed at 4/14/2025 0600  Gross per 24 hour   Intake 260 ml   Output 2651 ml   Net -2391 ml         Medications  Medications Prior to Admission   Medication Sig Dispense Refill Last Dose/Taking    ARIPiprazole (Abilify) 2 mg tablet Take 1 tablet (2 mg) by mouth once daily.   4/12/2025 Morning    cyanocobalamin, vitamin B-12, 2,500 mcg tablet, sublingual SL tablet Place 1 tablet (2,500 mcg) under the tongue 2 times a week. Every Wednesday and Friday 4/11/2025    ergocalciferol (Vitamin D-2) 1250 mcg (50,000 units) capsule Take 1 capsule (1.25 mg) by mouth 2 times a week. Every Thursday and Dread   4/10/2025    mirtazapine (Remeron) 15 mg tablet Take 1 tablet (15 mg) by mouth once daily at bedtime.   4/11/2025 Evening    multivit-min/folic acid/lutein (CENTRUM SILVER ORAL) Take 1 tablet by mouth once daily.   4/12/2025 Morning    omeprazole (PriLOSEC) 20 mg DR capsule Take 1 capsule (20 mg) by mouth once daily at bedtime.   4/11/2025 Evening    PARoxetine (Paxil) 40 mg tablet Take 1 tablet (40 mg) by mouth once daily in the morning.   4/12/2025 Morning    tamsulosin (Flomax) 0.4 mg 24 hr capsule Take 1 capsule (0.4 mg) by mouth once daily in the morning.   4/12/2025 Morning      Scheduled medications  ARIPiprazole, 2 mg, oral, Daily  heparin (porcine), 5,000 Units, subcutaneous, q8h ERIN  mirtazapine, 15 mg, oral, Nightly  pantoprazole, 40 mg, oral, Daily  before breakfast  PARoxetine, 40 mg, oral, q AM  tamsulosin, 0.4 mg, oral, q AM      Continuous medications     PRN medications  PRN medications: acetaminophen **OR** acetaminophen **OR** acetaminophen, ondansetron **OR** ondansetron, traZODone    Relevant Results      Admission on 04/12/2025   Component Date Value Ref Range Status    WBC 04/12/2025 15.0 (H)  4.4 - 11.3 x10*3/uL Final    nRBC 04/12/2025 0.0  0.0 - 0.0 /100 WBCs Final    RBC 04/12/2025 5.35  4.50 - 5.90 x10*6/uL Final    Hemoglobin 04/12/2025 16.4  13.5 - 17.5 g/dL Final    Hematocrit 04/12/2025 52.1 (H)  41.0 - 52.0 % Final    MCV 04/12/2025 97  80 - 100 fL Final    MCH 04/12/2025 30.7  26.0 - 34.0 pg Final    MCHC 04/12/2025 31.5 (L)  32.0 - 36.0 g/dL Final    RDW 04/12/2025 13.3  11.5 - 14.5 % Final    Platelets 04/12/2025 296  150 - 450 x10*3/uL Final    Neutrophils % 04/12/2025 82.5  40.0 - 80.0 % Final    Immature Granulocytes %, Automated 04/12/2025 0.7  0.0 - 0.9 % Final    Immature Granulocyte Count (IG) includes promyelocytes, myelocytes and metamyelocytes but does not include bands. Percent differential counts (%) should be interpreted in the context of the absolute cell counts (cells/UL).    Lymphocytes % 04/12/2025 6.6  13.0 - 44.0 % Final    Monocytes % 04/12/2025 9.9  2.0 - 10.0 % Final    Eosinophils % 04/12/2025 0.1  0.0 - 6.0 % Final    Basophils % 04/12/2025 0.2  0.0 - 2.0 % Final    Neutrophils Absolute 04/12/2025 12.39 (H)  1.60 - 5.50 x10*3/uL Final    Percent differential counts (%) should be interpreted in the context of the absolute cell counts (cells/uL).    Immature Granulocytes Absolute, Au* 04/12/2025 0.11  0.00 - 0.50 x10*3/uL Final    Lymphocytes Absolute 04/12/2025 0.99  0.80 - 3.00 x10*3/uL Final    Monocytes Absolute 04/12/2025 1.49 (H)  0.05 - 0.80 x10*3/uL Final    Eosinophils Absolute 04/12/2025 0.01  0.00 - 0.40 x10*3/uL Final    Basophils Absolute 04/12/2025 0.03  0.00 - 0.10 x10*3/uL Final    Glucose 04/12/2025  139 (H)  74 - 99 mg/dL Final    Sodium 04/12/2025 135 (L)  136 - 145 mmol/L Final    Potassium 04/12/2025 4.5  3.5 - 5.3 mmol/L Final    MILD HEMOLYSIS DETECTED. The result may be falsely elevated due to hemolysis or other interferents. Clinical correlation is recommended. Repeat testing may be considered.    Chloride 04/12/2025 95 (L)  98 - 107 mmol/L Final    Bicarbonate 04/12/2025 13 (L)  21 - 32 mmol/L Final    Anion Gap 04/12/2025 32 (H)  10 - 20 mmol/L Final    Urea Nitrogen 04/12/2025 71 (H)  6 - 23 mg/dL Final    Creatinine 04/12/2025 5.88 (H)  0.50 - 1.30 mg/dL Final    eGFR 04/12/2025 9 (L)  >60 mL/min/1.73m*2 Final    Calculations of estimated GFR are performed using the 2021 CKD-EPI Study Refit equation without the race variable for the IDMS-Traceable creatinine methods.  https://jasn.asnjournals.org/content/early/2021/09/22/ASN.6480583688    Calcium 04/12/2025 10.5 (H)  8.6 - 10.3 mg/dL Final    Albumin 04/12/2025 5.4 (H)  3.4 - 5.0 g/dL Final    Alkaline Phosphatase 04/12/2025 125  33 - 136 U/L Final    Total Protein 04/12/2025 9.8 (H)  6.4 - 8.2 g/dL Final    AST 04/12/2025 67 (H)  9 - 39 U/L Final    MILD HEMOLYSIS DETECTED. The result may be falsely elevated due to hemolysis or other interferents. Clinical correlation is recommended. Repeat testing may be considered.    Bilirubin, Total 04/12/2025 0.8  0.0 - 1.2 mg/dL Final    ALT 04/12/2025 51  10 - 52 U/L Final    Patients treated with Sulfasalazine may generate falsely decreased results for ALT.    Lipase 04/12/2025 45  9 - 82 U/L Final    Magnesium 04/12/2025 2.18  1.60 - 2.40 mg/dL Final    Extra Tube 04/12/2025 Hold for add-ons.   Final    Auto resulted.    POCT pH, Venous 04/12/2025 7.37  7.33 - 7.43 pH Final    POCT pCO2, Venous 04/12/2025 23 (L)  41 - 51 mm Hg Final    POCT pO2, Venous 04/12/2025 69 (H)  35 - 45 mm Hg Final    POCT SO2, Venous 04/12/2025 96 (H)  45 - 75 % Final    POCT Oxy Hemoglobin, Venous 04/12/2025 91.9 (H)  45.0 - 75.0  % Final    POCT Hematocrit Calculated, Venous 04/12/2025 47.0  41.0 - 52.0 % Final    POCT Sodium, Venous 04/12/2025 127 (L)  136 - 145 mmol/L Final    POCT Potassium, Venous 04/12/2025 8.1 (HH)  3.5 - 5.3 mmol/L Final    POCT Chloride, Venous 04/12/2025 103  98 - 107 mmol/L Final    POCT Ionized Calicum, Venous 04/12/2025 0.96 (L)  1.10 - 1.33 mmol/L Final    POCT Glucose, Venous 04/12/2025 114 (H)  74 - 99 mg/dL Final    POCT Lactate, Venous 04/12/2025 2.2 (H)  0.4 - 2.0 mmol/L Final    POCT Base Excess, Venous 04/12/2025 -9.7 (L)  -2.0 - 3.0 mmol/L Final    POCT HCO3 Calculated, Venous 04/12/2025 13.3 (L)  22.0 - 26.0 mmol/L Final    POCT Hemoglobin, Venous 04/12/2025 15.8  13.5 - 17.5 g/dL Final    POCT Anion Gap, Venous 04/12/2025 19.0  10.0 - 25.0 mmol/L Final    Patient Temperature 04/12/2025 37.0  degrees Celsius Final    FiO2 04/12/2025 21  % Final    Critical Called By 04/12/2025 REYNA FRANK RRT   Final    Critical Called To 04/12/2025 DR DELA CRUZ   Final    Critical Call Time 04/12/2025 2006   Final    Critical Read Back 04/12/2025 Y   Final    WBC 04/13/2025 10.9  4.4 - 11.3 x10*3/uL Final    nRBC 04/13/2025 0.0  0.0 - 0.0 /100 WBCs Final    RBC 04/13/2025 4.68  4.50 - 5.90 x10*6/uL Final    Hemoglobin 04/13/2025 14.1  13.5 - 17.5 g/dL Final    Hematocrit 04/13/2025 43.2  41.0 - 52.0 % Final    MCV 04/13/2025 92  80 - 100 fL Final    MCH 04/13/2025 30.1  26.0 - 34.0 pg Final    MCHC 04/13/2025 32.6  32.0 - 36.0 g/dL Final    RDW 04/13/2025 13.2  11.5 - 14.5 % Final    Platelets 04/13/2025 304  150 - 450 x10*3/uL Final    Glucose 04/13/2025 87  74 - 99 mg/dL Final    Sodium 04/13/2025 137  136 - 145 mmol/L Final    Potassium 04/13/2025 4.2  3.5 - 5.3 mmol/L Final    Chloride 04/13/2025 99  98 - 107 mmol/L Final    Bicarbonate 04/13/2025 18 (L)  21 - 32 mmol/L Final    Anion Gap 04/13/2025 24 (H)  10 - 20 mmol/L Final    Urea Nitrogen 04/13/2025 83 (H)  6 - 23 mg/dL Final    Creatinine 04/13/2025 4.95 (H)   0.50 - 1.30 mg/dL Final    eGFR 04/13/2025 12 (L)  >60 mL/min/1.73m*2 Final    Calculations of estimated GFR are performed using the 2021 CKD-EPI Study Refit equation without the race variable for the IDMS-Traceable creatinine methods.  https://jasn.asnjournals.org/content/early/2021/09/22/ASN.3654157508    Calcium 04/13/2025 9.3  8.6 - 10.3 mg/dL Final    Sodium, Urine Random 04/13/2025 12  mmol/L Final    Creatinine, Urine Random 04/13/2025 259.5  20.0 - 370.0 mg/dL Final    Sodium/Creatinine Ratio 04/13/2025 5  Not established. mmol/g Creat Final    C-Reactive Protein 04/13/2025 2.21 (H)  <1.00 mg/dL Final    Sedimentation Rate 04/13/2025 88 (H)  0 - 20 mm/h Final    Campylobacter Group 04/13/2025 Not Detected  Not Detected Final    Salmonella species 04/13/2025 Not Detected  Not Detected Final    Shigella species 04/13/2025 Not Detected  Not Detected Final    Vibrio Group 04/13/2025 Not Detected  Not Detected Final    Yersinia Enterocolitica 04/13/2025 Not Detected  Not Detected Final    Shiga Toxin 1 04/13/2025 Not Detected  Not Detected Final    Shiga Toxin 2 04/13/2025 Not Detected  Not Detected Final    Norovirus GI/GII 04/13/2025 Not Detected  Not Detected Final    Rotavirus A 04/13/2025 Detected (A)  Not Detected Final    C. difficile, PCR 04/13/2025 Not Detected  Not Detected Final    WBC 04/14/2025 8.1  4.4 - 11.3 x10*3/uL Final    nRBC 04/14/2025 0.0  0.0 - 0.0 /100 WBCs Final    RBC 04/14/2025 4.40 (L)  4.50 - 5.90 x10*6/uL Final    Hemoglobin 04/14/2025 13.3 (L)  13.5 - 17.5 g/dL Final    Hematocrit 04/14/2025 41.3  41.0 - 52.0 % Final    MCV 04/14/2025 94  80 - 100 fL Final    MCH 04/14/2025 30.2  26.0 - 34.0 pg Final    MCHC 04/14/2025 32.2  32.0 - 36.0 g/dL Final    RDW 04/14/2025 13.3  11.5 - 14.5 % Final    Platelets 04/14/2025 254  150 - 450 x10*3/uL Final    Glucose 04/14/2025 97  74 - 99 mg/dL Final    Sodium 04/14/2025 138  136 - 145 mmol/L Final    Potassium 04/14/2025 3.5  3.5 - 5.3 mmol/L  Final    Chloride 04/14/2025 104  98 - 107 mmol/L Final    Bicarbonate 04/14/2025 22  21 - 32 mmol/L Final    Anion Gap 04/14/2025 16  10 - 20 mmol/L Final    Urea Nitrogen 04/14/2025 80 (H)  6 - 23 mg/dL Final    Creatinine 04/14/2025 2.87 (H)  0.50 - 1.30 mg/dL Final    eGFR 04/14/2025 22 (L)  >60 mL/min/1.73m*2 Final    Calculations of estimated GFR are performed using the 2021 CKD-EPI Study Refit equation without the race variable for the IDMS-Traceable creatinine methods.  https://jasn.asnjournals.org/content/early/2021/09/22/ASN.0429778487    Calcium 04/14/2025 8.9  8.6 - 10.3 mg/dL Final      Imaging  CT abdomen pelvis wo IV contrast    Result Date: 4/12/2025  Cholelithiasis. Evaluation of the gallbladder is otherwise limited, and if the patient is experiencing right upper quadrant pain or there is otherwise concern for acute gallbladder pathology, ultrasound may be obtained for further evaluation.   Postsurgical change of prior bowel resection and right side ostomy. Large left inguinal hernia with herniation of multiple bowel loops. No evidence of bowel obstruction.   Large 4.4 cm calculus in the urinary bladder.   MACRO: None   Signed by: Parker Cooper 4/12/2025 6:26 PM Dictation workstation:   [id number]     Cardiology, Vascular, and Other Imaging  No other imaging results found for the past 7 days        ASSESSMENT AND PLAN    74 y.o. male with PMH of Crohn disease status post ileostomy, BPH presenting with increased output from his ileostomy , found to have severe ALFRED     ALFRED on CKD, baseline creatinine of 1.3, was 5.88 on admission, improving  Prerenal from high stoma output w/ s/o hemoconcentration on presentation     K stable  Mild HCa in setting of ALFRED  AGMA and NAGMA from gi losses     Hx of HTN here BP borderline low        Plan  - creatinine improving with ivf, continue today change to NS ggt (DOC)  - K stable  - resolved Hca  - noted high CRP, ESR  - large bladder calculus, check PVRs, continue  daily flomax  - might need bowel regimen if stoma output does not improve        Thank you for the opportunity to assist in the care of this patient, please call with questions  Carley Garcia MD PhD

## 2025-04-14 NOTE — CARE PLAN
The patient's goals for the shift include  sleep     The clinical goals for the shift include rest and comfort

## 2025-04-14 NOTE — PROGRESS NOTES
04/14/25 1422   Discharge Planning   Living Arrangements Alone   Support Systems Friends/neighbors   Assistance Needed none   Type of Residence Private residence   Number of Stairs to Enter Residence 2   Number of Stairs Within Residence 12   Expected Discharge Disposition Home   Does the patient need discharge transport arranged? Yes   Intensity of Service   Intensity of Service 0-30 min     4/14/2025  Met with pt in room, introduced self and explained role. Verified insurance, address, phone.   PCP= DR Jules Sierra  Pharmacy- [facility]       Able to obtain and afford medications.   Pt is from home, livesa lone.  Stated he is independent. No use of any assistive devices. Performs own ADL's. Has grab bars in shower in basement.  Still drives.  Has friends from [employer] that are supportive.  Will need a ride home upon discharge.  Does not feel he will have any other needs.   Ct Team will follow.    Caroline Rouse Rn TCC

## 2025-04-14 NOTE — PROGRESS NOTES
"Department of Internal Medicine  Gastroenterology  Progress note      Subjective  GI is following for diarrhea in setting of history of Crohn's    Today, he denies abdominal pain, nausea, or vomiting. His stool is starting to thicken      Current Medication    Current Facility-Administered Medications:     acetaminophen (Tylenol) tablet 650 mg, 650 mg, oral, q4h PRN **OR** acetaminophen (Tylenol) oral liquid 650 mg, 650 mg, oral, q4h PRN **OR** acetaminophen (Tylenol) suppository 650 mg, 650 mg, rectal, q4h PRN, Jemal Vidal, DO    ARIPiprazole (Abilify) tablet 2 mg, 2 mg, oral, Daily, Jemal A Young, DO, 2 mg at 04/13/25 0851    heparin (porcine) injection 5,000 Units, 5,000 Units, subcutaneous, q8h ERIN, Jemal A Young, DO, 5,000 Units at 04/14/25 0556    mirtazapine (Remeron) tablet 15 mg, 15 mg, oral, Nightly, Jemal EMIR Vidal, DO, 15 mg at 04/14/25 0100    ondansetron (Zofran) tablet 4 mg, 4 mg, oral, q8h PRN **OR** ondansetron (Zofran) injection 4 mg, 4 mg, intravenous, q8h PRN, Jemal EMIR Vidal, DO    pantoprazole (ProtoNix) EC tablet 40 mg, 40 mg, oral, Daily before breakfast, Jemal A Young, DO, 40 mg at 04/14/25 0600    PARoxetine (Paxil) tablet 40 mg, 40 mg, oral, q AM, Franca Pritchard MD, 40 mg at 04/13/25 1205    tamsulosin (Flomax) 24 hr capsule 0.4 mg, 0.4 mg, oral, q AM, Jemal Vidal, DO, 0.4 mg at 04/13/25 0851    traZODone (Desyrel) tablet 50 mg, 50 mg, oral, Nightly PRN, Jemal EMIR Haganng, DO, 50 mg at 04/13/25 9433    Past Medical History  Active Ambulatory Problems     Diagnosis Date Noted    No Active Ambulatory Problems     Resolved Ambulatory Problems     Diagnosis Date Noted    No Resolved Ambulatory Problems     Past Medical History:   Diagnosis Date    Personal history of other diseases of the digestive system 08/31/2020       PHYSICAL EXAM  VS: BP 97/59 (BP Location: Right arm, Patient Position: Lying)   Pulse 71   Temp 36.8 °C (98.2 °F) (Temporal)   Resp 18   Ht 1.803 m (5' 11\")   Wt 94.8 kg (209 lb)   SpO2 92%  "  BMI 29.15 kg/m²  Body mass index is 29.15 kg/m².  Physical Exam  Constitutional:       General: He is not in acute distress.     Comments: Chronically ill appearing   HENT:      Head: Normocephalic and atraumatic.      Mouth/Throat:      Mouth: Mucous membranes are moist.      Pharynx: Oropharynx is clear.   Eyes:      General: No scleral icterus.     Extraocular Movements: Extraocular movements intact.      Conjunctiva/sclera: Conjunctivae normal.      Pupils: Pupils are equal, round, and reactive to light.   Cardiovascular:      Rate and Rhythm: Normal rate and regular rhythm.      Heart sounds: No murmur heard.  Pulmonary:      Effort: Pulmonary effort is normal.      Breath sounds: No wheezing or rhonchi.   Abdominal:      General: Bowel sounds are normal. There is no distension.      Palpations: Abdomen is soft. There is no mass.      Tenderness: There is no abdominal tenderness.      Hernia: No hernia is present.      Comments: Ostomy in RLQ with thick loose green stool   Musculoskeletal:         General: No swelling or deformity.   Skin:     General: Skin is warm.      Coloration: Skin is not jaundiced.   Neurological:      General: No focal deficit present.      Mental Status: He is alert and oriented to person, place, and time.   Psychiatric:         Mood and Affect: Mood normal.          DATA  Recent blood work was reviewed and discussed with the patient   Results from last 7 days   Lab Units 04/14/25  0544   WBC AUTO x10*3/uL 8.1   RBC AUTO x10*6/uL 4.40*   HEMOGLOBIN g/dL 13.3*   HEMATOCRIT % 41.3   MCV fL 94   MCHC g/dL 32.2   RDW % 13.3   PLATELETS AUTO x10*3/uL 254       Results from last 72 hours   Lab Units 04/14/25  0544 04/13/25  0532 04/12/25  1626   SODIUM mmol/L 138   < > 135*   POTASSIUM mmol/L 3.5   < > 4.5   CHLORIDE mmol/L 104   < > 95*   CO2 mmol/L 22   < > 13*   BUN mg/dL 80*   < > 71*   CREATININE mg/dL 2.87*   < > 5.88*   CALCIUM mg/dL 8.9   < > 10.5*   PROTEIN TOTAL g/dL  --   --   9.8*   BILIRUBIN TOTAL mg/dL  --   --  0.8   ALK PHOS U/L  --   --  125   AST U/L  --   --  67*   ALT U/L  --   --  51    < > = values in this interval not displayed.             Results from last 72 hours   Lab Units 04/12/25  1626   LIPASE U/L 45      Latest Reference Range & Units 04/13/25 12:09   Campylobacter Group Not Detected  Not Detected   Salmonella species Not Detected  Not Detected   Shigella species Not Detected  Not Detected   Vibrio Group Not Detected  Not Detected   Yersinia enterocolitica Not Detected  Not Detected   Shiga Toxin 1 Not Detected  Not Detected   Shiga Toxin 2 Not Detected  Not Detected   Norovirus GI/GII Not Detected  Not Detected   Rotavirus A Not Detected  Detected !   C. difficile, PCR Not Detected  Not Detected   !: Data is abnormal    RADIOLOGY REVIEW  NA    ENDOSCOPIC REVIEW  NA    IMPRESSION/RECOMMENDATIONS  The patient is a 74 y.o. male with signficant past medical history of Crohn's disease s/p proctocolectomy and BPH with ileostomy who presents for chorea and weakness.     Diarrhea -Positive for rotavirus  ALFRED - improving, nephrology following  History of Crohn's status post proctoscopy ileocolectomy 1977 with stomal revision in 1987     PLAN  -Recommend follow up in GI clinic as needed  -Currently pantoprazole 40 mg daily  -Currently on a general diet  -Supportive care per primary team     Discussed with KI Shields to sign off, please call with questions or concerns      (Electronically signed byAlice Wells PA-C on 4/14/2025 at 8:15 AM)

## 2025-04-14 NOTE — PROGRESS NOTES
Antoine Kimball is a 74 y.o. male on day 2 of admission presenting with ALFRED (acute kidney injury).      Subjective   Resting in bed.  No complaints.       Objective     Last Recorded Vitals  BP 88/67 (BP Location: Right arm, Patient Position: Lying)   Pulse 75   Temp 36.5 °C (97.7 °F) (Temporal)   Resp 18   Wt 94.8 kg (209 lb)   SpO2 93%   Intake/Output last 3 Shifts:    Intake/Output Summary (Last 24 hours) at 4/14/2025 1534  Last data filed at 4/14/2025 1209  Gross per 24 hour   Intake --   Output 2391 ml   Net -2391 ml       Admission Weight  Weight: 68 kg (150 lb) (04/12/25 1558)    Daily Weight  04/12/25 : 94.8 kg (209 lb)    Image Results  ECG 12 lead  Normal sinus rhythm  Left axis deviation  Minimal voltage criteria for LVH, may be normal variant ( R in aVL )  Cannot rule out Anterior infarct , age undetermined  Abnormal ECG  No previous ECGs available      Physical Exam  Vitals reviewed.   Constitutional:       Appearance: Normal appearance.   HENT:      Head: Normocephalic and atraumatic.      Mouth/Throat:      Mouth: Mucous membranes are moist.   Eyes:      Conjunctiva/sclera: Conjunctivae normal.   Cardiovascular:      Rate and Rhythm: Normal rate.      Pulses: Normal pulses.   Pulmonary:      Effort: Pulmonary effort is normal.      Breath sounds: Normal breath sounds. No wheezing.   Abdominal:      General: Abdomen is flat. There is no distension.      Palpations: Abdomen is soft.      Tenderness: There is no guarding.   Musculoskeletal:         General: No swelling. Normal range of motion.   Skin:     General: Skin is warm and dry.   Neurological:      General: No focal deficit present.      Mental Status: He is alert. Mental status is at baseline.   Psychiatric:         Mood and Affect: Mood normal.         Behavior: Behavior normal.         Relevant Results               Assessment/Plan   This patient currently has cardiac telemetry ordered; if you would like to modify or discontinue the  telemetry order, click here to go to the orders activity to modify/discontinue the order.              Assessment & Plan  ALFRED (acute kidney injury)    Hypovolemia      # Hypovolemia  # Acute renal failure, prerenal likely ATN  # Metabolic acidosis 2/2 renal failure  # Diarrhea  # SIRS without sepsis  # Large bladder calculus  # H/o Crohn disease s/p ileostomy  # BPH     Plan:  Serum creatinine continues downward trend  Diarrhea + rotavirus - GI agrees with conservative mgmt  Reviewed urology recommendations, the patient will follow-up with his established urologist, no indication for urgent urologic intervention  Monitor electrolytes  DVT prophylaxis     Aaron Arizmendi MD

## 2025-04-15 LAB
ALBUMIN SERPL BCP-MCNC: 3.8 G/DL (ref 3.4–5)
ANION GAP SERPL CALC-SCNC: 13 MMOL/L (ref 10–20)
BUN SERPL-MCNC: 69 MG/DL (ref 6–23)
CALCIUM SERPL-MCNC: 8.7 MG/DL (ref 8.6–10.3)
CHLORIDE SERPL-SCNC: 108 MMOL/L (ref 98–107)
CO2 SERPL-SCNC: 23 MMOL/L (ref 21–32)
CREAT SERPL-MCNC: 1.83 MG/DL (ref 0.5–1.3)
EGFRCR SERPLBLD CKD-EPI 2021: 38 ML/MIN/1.73M*2
GLUCOSE SERPL-MCNC: 172 MG/DL (ref 74–99)
HOLD SPECIMEN: NORMAL
PHOSPHATE SERPL-MCNC: 2.1 MG/DL (ref 2.5–4.9)
POTASSIUM SERPL-SCNC: 3.5 MMOL/L (ref 3.5–5.3)
SODIUM SERPL-SCNC: 140 MMOL/L (ref 136–145)

## 2025-04-15 PROCEDURE — 36415 COLL VENOUS BLD VENIPUNCTURE: CPT | Performed by: INTERNAL MEDICINE

## 2025-04-15 PROCEDURE — 2500000004 HC RX 250 GENERAL PHARMACY W/ HCPCS (ALT 636 FOR OP/ED): Performed by: INTERNAL MEDICINE

## 2025-04-15 PROCEDURE — 2500000001 HC RX 250 WO HCPCS SELF ADMINISTERED DRUGS (ALT 637 FOR MEDICARE OP): Performed by: INTERNAL MEDICINE

## 2025-04-15 PROCEDURE — 2060000001 HC INTERMEDIATE ICU ROOM DAILY

## 2025-04-15 PROCEDURE — 84100 ASSAY OF PHOSPHORUS: CPT | Performed by: INTERNAL MEDICINE

## 2025-04-15 PROCEDURE — 2500000002 HC RX 250 W HCPCS SELF ADMINISTERED DRUGS (ALT 637 FOR MEDICARE OP, ALT 636 FOR OP/ED): Performed by: FAMILY MEDICINE

## 2025-04-15 PROCEDURE — 2500000002 HC RX 250 W HCPCS SELF ADMINISTERED DRUGS (ALT 637 FOR MEDICARE OP, ALT 636 FOR OP/ED): Performed by: INTERNAL MEDICINE

## 2025-04-15 PROCEDURE — 99233 SBSQ HOSP IP/OBS HIGH 50: CPT | Performed by: INTERNAL MEDICINE

## 2025-04-15 RX ORDER — SODIUM CHLORIDE 9 MG/ML
100 INJECTION, SOLUTION INTRAVENOUS CONTINUOUS
Status: ACTIVE | OUTPATIENT
Start: 2025-04-15 | End: 2025-04-16

## 2025-04-15 RX ADMIN — HEPARIN SODIUM 5000 UNITS: 5000 INJECTION INTRAVENOUS; SUBCUTANEOUS at 14:00

## 2025-04-15 RX ADMIN — TRAZODONE HYDROCHLORIDE 50 MG: 50 TABLET ORAL at 22:46

## 2025-04-15 RX ADMIN — PANTOPRAZOLE SODIUM 40 MG: 40 TABLET, DELAYED RELEASE ORAL at 06:19

## 2025-04-15 RX ADMIN — ARIPIPRAZOLE 2 MG: 2 TABLET ORAL at 09:59

## 2025-04-15 RX ADMIN — SODIUM CHLORIDE 75 ML/HR: 9 INJECTION, SOLUTION INTRAVENOUS at 06:19

## 2025-04-15 RX ADMIN — HEPARIN SODIUM 5000 UNITS: 5000 INJECTION INTRAVENOUS; SUBCUTANEOUS at 21:37

## 2025-04-15 RX ADMIN — PAROXETINE HYDROCHLORIDE 40 MG: 20 TABLET, FILM COATED ORAL at 09:02

## 2025-04-15 RX ADMIN — TAMSULOSIN HYDROCHLORIDE 0.4 MG: 0.4 CAPSULE ORAL at 09:02

## 2025-04-15 RX ADMIN — HEPARIN SODIUM 5000 UNITS: 5000 INJECTION INTRAVENOUS; SUBCUTANEOUS at 06:19

## 2025-04-15 RX ADMIN — MIRTAZAPINE 15 MG: 15 TABLET, FILM COATED ORAL at 21:37

## 2025-04-15 RX ADMIN — SODIUM CHLORIDE 100 ML/HR: 0.9 INJECTION, SOLUTION INTRAVENOUS at 18:12

## 2025-04-15 ASSESSMENT — COGNITIVE AND FUNCTIONAL STATUS - GENERAL
DRESSING REGULAR UPPER BODY CLOTHING: A LITTLE
TOILETING: A LITTLE
CLIMB 3 TO 5 STEPS WITH RAILING: A LOT
HELP NEEDED FOR BATHING: A LITTLE
WALKING IN HOSPITAL ROOM: A LITTLE
STANDING UP FROM CHAIR USING ARMS: A LITTLE
TOILETING: A LITTLE
HELP NEEDED FOR BATHING: A LITTLE
PERSONAL GROOMING: A LITTLE
DAILY ACTIVITIY SCORE: 18
HELP NEEDED FOR BATHING: A LITTLE
DAILY ACTIVITIY SCORE: 18
PERSONAL GROOMING: A LITTLE
PERSONAL GROOMING: A LITTLE
EATING MEALS: A LITTLE
CLIMB 3 TO 5 STEPS WITH RAILING: A LOT
DRESSING REGULAR LOWER BODY CLOTHING: A LITTLE
EATING MEALS: A LITTLE
EATING MEALS: A LITTLE
DRESSING REGULAR LOWER BODY CLOTHING: A LITTLE
MOBILITY SCORE: 19
STANDING UP FROM CHAIR USING ARMS: A LITTLE
DRESSING REGULAR LOWER BODY CLOTHING: A LITTLE
WALKING IN HOSPITAL ROOM: A LITTLE
MOBILITY SCORE: 19
MOVING TO AND FROM BED TO CHAIR: A LITTLE
DAILY ACTIVITIY SCORE: 18
MOVING TO AND FROM BED TO CHAIR: A LITTLE
DRESSING REGULAR UPPER BODY CLOTHING: A LITTLE
CLIMB 3 TO 5 STEPS WITH RAILING: A LOT
STANDING UP FROM CHAIR USING ARMS: A LITTLE
TOILETING: A LITTLE
DRESSING REGULAR UPPER BODY CLOTHING: A LITTLE
WALKING IN HOSPITAL ROOM: A LITTLE
MOBILITY SCORE: 19
MOVING TO AND FROM BED TO CHAIR: A LITTLE

## 2025-04-15 ASSESSMENT — PAIN SCALES - GENERAL
PAINLEVEL_OUTOF10: 0 - NO PAIN
PAINLEVEL_OUTOF10: 0 - NO PAIN

## 2025-04-15 NOTE — PROGRESS NOTES
Nephrology Consult Progress Note    Antoine Kimball is a 74 y.o. male on day 3 of admission presenting with ALFRED (acute kidney injury).      Subjective   No acute events overnight, improved stoma output, denies abd pain       Objective          Physical Exam    Vitals 24HR  Heart Rate:  [63-77]   Temp:  [36.1 °C (97 °F)-37.2 °C (99 °F)]   Resp:  [16-18]   BP: ()/(53-58)   SpO2:  [90 %-94 %]         AAOx3  Tachycardic, murmur+  CTA BL  Abd soft, + BS, ileostomy +  No edema           I&O 24HR    Intake/Output Summary (Last 24 hours) at 4/15/2025 1230  Last data filed at 4/15/2025 0619  Gross per 24 hour   Intake 1702.5 ml   Output 1900 ml   Net -197.5 ml         Medications  Medications Prior to Admission   Medication Sig Dispense Refill Last Dose/Taking    ARIPiprazole (Abilify) 2 mg tablet Take 1 tablet (2 mg) by mouth once daily.   4/12/2025 Morning    cyanocobalamin, vitamin B-12, 2,500 mcg tablet, sublingual SL tablet Place 1 tablet (2,500 mcg) under the tongue 2 times a week. Every Wednesday and Friday 4/11/2025    ergocalciferol (Vitamin D-2) 1250 mcg (50,000 units) capsule Take 1 capsule (1.25 mg) by mouth 2 times a week. Every Thursday and Dread   4/10/2025    mirtazapine (Remeron) 15 mg tablet Take 1 tablet (15 mg) by mouth once daily at bedtime.   4/11/2025 Evening    multivit-min/folic acid/lutein (CENTRUM SILVER ORAL) Take 1 tablet by mouth once daily.   4/12/2025 Morning    omeprazole (PriLOSEC) 20 mg DR capsule Take 1 capsule (20 mg) by mouth once daily at bedtime.   4/11/2025 Evening    PARoxetine (Paxil) 40 mg tablet Take 1 tablet (40 mg) by mouth once daily in the morning.   4/12/2025 Morning    tamsulosin (Flomax) 0.4 mg 24 hr capsule Take 1 capsule (0.4 mg) by mouth once daily in the morning.   4/12/2025 Morning      Scheduled medications  ARIPiprazole, 2 mg, oral, Daily  heparin (porcine), 5,000 Units, subcutaneous, q8h ERIN  mirtazapine, 15 mg, oral, Nightly  pantoprazole, 40 mg, oral,  Daily before breakfast  PARoxetine, 40 mg, oral, q AM  tamsulosin, 0.4 mg, oral, q AM      Continuous medications  sodium chloride 0.9%, 75 mL/hr, Last Rate: 75 mL/hr (04/15/25 0619)      PRN medications  PRN medications: acetaminophen **OR** acetaminophen **OR** acetaminophen, ondansetron **OR** ondansetron, traZODone    Relevant Results      Admission on 04/12/2025   Component Date Value Ref Range Status    WBC 04/12/2025 15.0 (H)  4.4 - 11.3 x10*3/uL Final    nRBC 04/12/2025 0.0  0.0 - 0.0 /100 WBCs Final    RBC 04/12/2025 5.35  4.50 - 5.90 x10*6/uL Final    Hemoglobin 04/12/2025 16.4  13.5 - 17.5 g/dL Final    Hematocrit 04/12/2025 52.1 (H)  41.0 - 52.0 % Final    MCV 04/12/2025 97  80 - 100 fL Final    MCH 04/12/2025 30.7  26.0 - 34.0 pg Final    MCHC 04/12/2025 31.5 (L)  32.0 - 36.0 g/dL Final    RDW 04/12/2025 13.3  11.5 - 14.5 % Final    Platelets 04/12/2025 296  150 - 450 x10*3/uL Final    Neutrophils % 04/12/2025 82.5  40.0 - 80.0 % Final    Immature Granulocytes %, Automated 04/12/2025 0.7  0.0 - 0.9 % Final    Immature Granulocyte Count (IG) includes promyelocytes, myelocytes and metamyelocytes but does not include bands. Percent differential counts (%) should be interpreted in the context of the absolute cell counts (cells/UL).    Lymphocytes % 04/12/2025 6.6  13.0 - 44.0 % Final    Monocytes % 04/12/2025 9.9  2.0 - 10.0 % Final    Eosinophils % 04/12/2025 0.1  0.0 - 6.0 % Final    Basophils % 04/12/2025 0.2  0.0 - 2.0 % Final    Neutrophils Absolute 04/12/2025 12.39 (H)  1.60 - 5.50 x10*3/uL Final    Percent differential counts (%) should be interpreted in the context of the absolute cell counts (cells/uL).    Immature Granulocytes Absolute, Au* 04/12/2025 0.11  0.00 - 0.50 x10*3/uL Final    Lymphocytes Absolute 04/12/2025 0.99  0.80 - 3.00 x10*3/uL Final    Monocytes Absolute 04/12/2025 1.49 (H)  0.05 - 0.80 x10*3/uL Final    Eosinophils Absolute 04/12/2025 0.01  0.00 - 0.40 x10*3/uL Final     Basophils Absolute 04/12/2025 0.03  0.00 - 0.10 x10*3/uL Final    Glucose 04/12/2025 139 (H)  74 - 99 mg/dL Final    Sodium 04/12/2025 135 (L)  136 - 145 mmol/L Final    Potassium 04/12/2025 4.5  3.5 - 5.3 mmol/L Final    MILD HEMOLYSIS DETECTED. The result may be falsely elevated due to hemolysis or other interferents. Clinical correlation is recommended. Repeat testing may be considered.    Chloride 04/12/2025 95 (L)  98 - 107 mmol/L Final    Bicarbonate 04/12/2025 13 (L)  21 - 32 mmol/L Final    Anion Gap 04/12/2025 32 (H)  10 - 20 mmol/L Final    Urea Nitrogen 04/12/2025 71 (H)  6 - 23 mg/dL Final    Creatinine 04/12/2025 5.88 (H)  0.50 - 1.30 mg/dL Final    eGFR 04/12/2025 9 (L)  >60 mL/min/1.73m*2 Final    Calculations of estimated GFR are performed using the 2021 CKD-EPI Study Refit equation without the race variable for the IDMS-Traceable creatinine methods.  https://jasn.asnjournals.org/content/early/2021/09/22/ASN.5059141504    Calcium 04/12/2025 10.5 (H)  8.6 - 10.3 mg/dL Final    Albumin 04/12/2025 5.4 (H)  3.4 - 5.0 g/dL Final    Alkaline Phosphatase 04/12/2025 125  33 - 136 U/L Final    Total Protein 04/12/2025 9.8 (H)  6.4 - 8.2 g/dL Final    AST 04/12/2025 67 (H)  9 - 39 U/L Final    MILD HEMOLYSIS DETECTED. The result may be falsely elevated due to hemolysis or other interferents. Clinical correlation is recommended. Repeat testing may be considered.    Bilirubin, Total 04/12/2025 0.8  0.0 - 1.2 mg/dL Final    ALT 04/12/2025 51  10 - 52 U/L Final    Patients treated with Sulfasalazine may generate falsely decreased results for ALT.    Lipase 04/12/2025 45  9 - 82 U/L Final    Magnesium 04/12/2025 2.18  1.60 - 2.40 mg/dL Final    Ventricular Rate 04/12/2025 90  BPM Preliminary    Atrial Rate 04/12/2025 90  BPM Preliminary    NH Interval 04/12/2025 162  ms Preliminary    QRS Duration 04/12/2025 102  ms Preliminary    QT Interval 04/12/2025 362  ms Preliminary    QTC Calculation(Bazett) 04/12/2025 442   ms Preliminary    P Axis 04/12/2025 77  degrees Preliminary    R Axis 04/12/2025 -34  degrees Preliminary    T Axis 04/12/2025 33  degrees Preliminary    QRS Count 04/12/2025 15  beats Preliminary    Q Onset 04/12/2025 209  ms Preliminary    P Onset 04/12/2025 128  ms Preliminary    P Offset 04/12/2025 182  ms Preliminary    T Offset 04/12/2025 390  ms Preliminary    QTC Fredericia 04/12/2025 414  ms Preliminary    Extra Tube 04/12/2025 Hold for add-ons.   Final    Auto resulted.    POCT pH, Venous 04/12/2025 7.37  7.33 - 7.43 pH Final    POCT pCO2, Venous 04/12/2025 23 (L)  41 - 51 mm Hg Final    POCT pO2, Venous 04/12/2025 69 (H)  35 - 45 mm Hg Final    POCT SO2, Venous 04/12/2025 96 (H)  45 - 75 % Final    POCT Oxy Hemoglobin, Venous 04/12/2025 91.9 (H)  45.0 - 75.0 % Final    POCT Hematocrit Calculated, Venous 04/12/2025 47.0  41.0 - 52.0 % Final    POCT Sodium, Venous 04/12/2025 127 (L)  136 - 145 mmol/L Final    POCT Potassium, Venous 04/12/2025 8.1 (HH)  3.5 - 5.3 mmol/L Final    POCT Chloride, Venous 04/12/2025 103  98 - 107 mmol/L Final    POCT Ionized Calicum, Venous 04/12/2025 0.96 (L)  1.10 - 1.33 mmol/L Final    POCT Glucose, Venous 04/12/2025 114 (H)  74 - 99 mg/dL Final    POCT Lactate, Venous 04/12/2025 2.2 (H)  0.4 - 2.0 mmol/L Final    POCT Base Excess, Venous 04/12/2025 -9.7 (L)  -2.0 - 3.0 mmol/L Final    POCT HCO3 Calculated, Venous 04/12/2025 13.3 (L)  22.0 - 26.0 mmol/L Final    POCT Hemoglobin, Venous 04/12/2025 15.8  13.5 - 17.5 g/dL Final    POCT Anion Gap, Venous 04/12/2025 19.0  10.0 - 25.0 mmol/L Final    Patient Temperature 04/12/2025 37.0  degrees Celsius Final    FiO2 04/12/2025 21  % Final    Critical Called By 04/12/2025 REYNA FRANK RRT   Final    Critical Called To 04/12/2025 DR DELA CRUZ   Final    Critical Call Time 04/12/2025 2006   Final    Critical Read Back 04/12/2025 Y   Final    WBC 04/13/2025 10.9  4.4 - 11.3 x10*3/uL Final    nRBC 04/13/2025 0.0  0.0 - 0.0 /100 WBCs Final     RBC 04/13/2025 4.68  4.50 - 5.90 x10*6/uL Final    Hemoglobin 04/13/2025 14.1  13.5 - 17.5 g/dL Final    Hematocrit 04/13/2025 43.2  41.0 - 52.0 % Final    MCV 04/13/2025 92  80 - 100 fL Final    MCH 04/13/2025 30.1  26.0 - 34.0 pg Final    MCHC 04/13/2025 32.6  32.0 - 36.0 g/dL Final    RDW 04/13/2025 13.2  11.5 - 14.5 % Final    Platelets 04/13/2025 304  150 - 450 x10*3/uL Final    Glucose 04/13/2025 87  74 - 99 mg/dL Final    Sodium 04/13/2025 137  136 - 145 mmol/L Final    Potassium 04/13/2025 4.2  3.5 - 5.3 mmol/L Final    Chloride 04/13/2025 99  98 - 107 mmol/L Final    Bicarbonate 04/13/2025 18 (L)  21 - 32 mmol/L Final    Anion Gap 04/13/2025 24 (H)  10 - 20 mmol/L Final    Urea Nitrogen 04/13/2025 83 (H)  6 - 23 mg/dL Final    Creatinine 04/13/2025 4.95 (H)  0.50 - 1.30 mg/dL Final    eGFR 04/13/2025 12 (L)  >60 mL/min/1.73m*2 Final    Calculations of estimated GFR are performed using the 2021 CKD-EPI Study Refit equation without the race variable for the IDMS-Traceable creatinine methods.  https://jasn.asnjournals.org/content/early/2021/09/22/ASN.1440799559    Calcium 04/13/2025 9.3  8.6 - 10.3 mg/dL Final    Sodium, Urine Random 04/13/2025 12  mmol/L Final    Creatinine, Urine Random 04/13/2025 259.5  20.0 - 370.0 mg/dL Final    Sodium/Creatinine Ratio 04/13/2025 5  Not established. mmol/g Creat Final    C-Reactive Protein 04/13/2025 2.21 (H)  <1.00 mg/dL Final    Sedimentation Rate 04/13/2025 88 (H)  0 - 20 mm/h Final    Campylobacter Group 04/13/2025 Not Detected  Not Detected Final    Salmonella species 04/13/2025 Not Detected  Not Detected Final    Shigella species 04/13/2025 Not Detected  Not Detected Final    Vibrio Group 04/13/2025 Not Detected  Not Detected Final    Yersinia Enterocolitica 04/13/2025 Not Detected  Not Detected Final    Shiga Toxin 1 04/13/2025 Not Detected  Not Detected Final    Shiga Toxin 2 04/13/2025 Not Detected  Not Detected Final    Norovirus GI/GII 04/13/2025 Not Detected   Not Detected Final    Rotavirus A 04/13/2025 Detected (A)  Not Detected Final    C. difficile, PCR 04/13/2025 Not Detected  Not Detected Final    WBC 04/14/2025 8.1  4.4 - 11.3 x10*3/uL Final    nRBC 04/14/2025 0.0  0.0 - 0.0 /100 WBCs Final    RBC 04/14/2025 4.40 (L)  4.50 - 5.90 x10*6/uL Final    Hemoglobin 04/14/2025 13.3 (L)  13.5 - 17.5 g/dL Final    Hematocrit 04/14/2025 41.3  41.0 - 52.0 % Final    MCV 04/14/2025 94  80 - 100 fL Final    MCH 04/14/2025 30.2  26.0 - 34.0 pg Final    MCHC 04/14/2025 32.2  32.0 - 36.0 g/dL Final    RDW 04/14/2025 13.3  11.5 - 14.5 % Final    Platelets 04/14/2025 254  150 - 450 x10*3/uL Final    Glucose 04/14/2025 97  74 - 99 mg/dL Final    Sodium 04/14/2025 138  136 - 145 mmol/L Final    Potassium 04/14/2025 3.5  3.5 - 5.3 mmol/L Final    Chloride 04/14/2025 104  98 - 107 mmol/L Final    Bicarbonate 04/14/2025 22  21 - 32 mmol/L Final    Anion Gap 04/14/2025 16  10 - 20 mmol/L Final    Urea Nitrogen 04/14/2025 80 (H)  6 - 23 mg/dL Final    Creatinine 04/14/2025 2.87 (H)  0.50 - 1.30 mg/dL Final    eGFR 04/14/2025 22 (L)  >60 mL/min/1.73m*2 Final    Calculations of estimated GFR are performed using the 2021 CKD-EPI Study Refit equation without the race variable for the IDMS-Traceable creatinine methods.  https://jasn.asnjournals.org/content/early/2021/09/22/ASN.0405250597    Calcium 04/14/2025 8.9  8.6 - 10.3 mg/dL Final    Glucose 04/15/2025 172 (H)  74 - 99 mg/dL Final    Sodium 04/15/2025 140  136 - 145 mmol/L Final    Potassium 04/15/2025 3.5  3.5 - 5.3 mmol/L Final    Chloride 04/15/2025 108 (H)  98 - 107 mmol/L Final    Bicarbonate 04/15/2025 23  21 - 32 mmol/L Final    Anion Gap 04/15/2025 13  10 - 20 mmol/L Final    Urea Nitrogen 04/15/2025 69 (H)  6 - 23 mg/dL Final    Creatinine 04/15/2025 1.83 (H)  0.50 - 1.30 mg/dL Final    eGFR 04/15/2025 38 (L)  >60 mL/min/1.73m*2 Final    Calculations of estimated GFR are performed using the 2021 CKD-EPI Study Refit equation  without the race variable for the IDMS-Traceable creatinine methods.  https://jasn.asnjournals.org/content/early/2021/09/22/ASN.5556194008    Calcium 04/15/2025 8.7  8.6 - 10.3 mg/dL Final    Phosphorus 04/15/2025 2.1 (L)  2.5 - 4.9 mg/dL Final    The performance characteristics of phosphorus testing in heparinized plasma have been validated by the individual  laboratory site where testing is performed. Testing on heparinized plasma is not approved by the FDA; however, such approval is not necessary.    Albumin 04/15/2025 3.8  3.4 - 5.0 g/dL Final    Extra Tube 04/15/2025 Hold for add-ons.   Final    Auto resulted.      Imaging  CT abdomen pelvis wo IV contrast    Result Date: 4/12/2025  Cholelithiasis. Evaluation of the gallbladder is otherwise limited, and if the patient is experiencing right upper quadrant pain or there is otherwise concern for acute gallbladder pathology, ultrasound may be obtained for further evaluation.   Postsurgical change of prior bowel resection and right side ostomy. Large left inguinal hernia with herniation of multiple bowel loops. No evidence of bowel obstruction.   Large 4.4 cm calculus in the urinary bladder.   MACRO: None   Signed by: Parker Cooper 4/12/2025 6:26 PM Dictation workstation:   [id number]     Cardiology, Vascular, and Other Imaging  ECG 12 lead    Result Date: 4/14/2025  Normal sinus rhythm Left axis deviation Minimal voltage criteria for LVH, may be normal variant ( R in aVL ) Cannot rule out Anterior infarct , age undetermined Abnormal ECG No previous ECGs available         ASSESSMENT AND PLAN    74 y.o. male with PMH of Crohn disease status post ileostomy, BPH presenting with increased output from his ileostomy , found to have severe ALFRED     ALFRED on CKD, baseline creatinine of 1.3, was 5.88 on admission, improving  Prerenal from high stoma output w/ s/o hemoconcentration on presentation     K stable  Mild HCa in setting of ALFRED  AGMA and NAGMA from gi losses     Hx  of HTN here BP borderline low        Plan  - creatinine improving with ivf, continue NS ggt (DOC) one more day  - K stable  - resolved Hca  - noted high CRP, ESR  - large bladder calculus, check PVRs, continue daily flomax          Thank you for the opportunity to assist in the care of this patient, please call with questions  Carley Garcia MD PhD

## 2025-04-15 NOTE — CARE PLAN
The patient's goals for the shift include      The clinical goals for the shift include rest and comfort    Over the shift, the patient did not make progress toward the following goals. Barriers to progression include . Recommendations to address these barriers include .

## 2025-04-15 NOTE — CARE PLAN
The patient's goals for the shift include      The clinical goals for the shift include patient will have more formed stool via ostomy with decreased output    Over the shift, the patient will remain hemodynamically stable and will refrain from further complications

## 2025-04-15 NOTE — CONSULTS
"Nutrition Initial Assessment:   Nutrition Assessment    Reason for Assessment: Admission nursing screening    Patient is a 74 y.o. male presenting with increased ostomy output      Nutrition History:  Food and Nutrient History: Intake has been relatively good.  Pt drinks a boost every morning and would like an ensure for breakfast       Anthropometrics:  Height: 180.3 cm (5' 11\")   Weight: 94.8 kg (209 lb)   BMI (Calculated): 29.16  IBW/kg (Dietitian Calculated): 81 kg  Percent of IBW: 117 %                      Weight History:   Wt Readings from Last 10 Encounters:   04/12/25 94.8 kg (209 lb)   08/31/20 82.1 kg (181 lb)         Weight Change %:  Weight History / % Weight Change: The only wt available shows pt is up 28 lbs over the past 5 years  Significant Weight Loss: No    Nutrition Focused Physical Exam Findings:    Subcutaneous Fat Loss:   Defer Subcutaneous Fat Loss Assessment: Defer all  Defer All Reason: not a good time  Muscle Wasting:  Defer Muscle Wasting Assessment: Defer all  Defer All Reason: not a good time  Edema:  Edema Location: some edema  Physical Findings:  Skin: Positive (Lt foot and Rt pretibial wounds)  Digestive System Findings: Diarrhea    Nutrition Significant Labs:  BMP Trend:   Results from last 7 days   Lab Units 04/14/25  0544 04/13/25  0532 04/12/25  1626   GLUCOSE mg/dL 97 87 139*   CALCIUM mg/dL 8.9 9.3 10.5*   SODIUM mmol/L 138 137 135*   POTASSIUM mmol/L 3.5 4.2 4.5   CO2 mmol/L 22 18* 13*   CHLORIDE mmol/L 104 99 95*   BUN mg/dL 80* 83* 71*   CREATININE mg/dL 2.87* 4.95* 5.88*        Nutrition Specific Medications:  Remeron; protonix ;zofran     I/O:   Last BM Date: 04/14/25; Stool Appearance: Soft, Watery (04/14/25 2200)    Dietary Orders (From admission, onward)       Start     Ordered    04/14/25 2205  Oral nutritional supplements  Until discontinued        Comments: Vanilla   Question Answer Comment   Deliver with Breakfast    Select supplement: Ensure Plus High Protein     "    04/14/25 2204 04/12/25 2339  May Participate in Room Service  ( ROOM SERVICE MAY PARTICIPATE)  Once        Question:  .  Answer:  Yes    04/12/25 2338    04/12/25 2034  Adult diet Regular  Diet effective now        Question:  Diet type  Answer:  Regular    04/12/25 2033                     Estimated Needs:      Method for Estimating Needs: 6455-8830   26-30 al kg of IBW     Method for Estimating 24 Hour Protein Needs: 81-97  1-1.2 gm kg of IBW     Method for Estimating 24 Hour Fluid Needs: 7489-6833  20-30 ml kg of IBW as medically indicated  Patient on Order Fluid Restriction: No        Nutrition Diagnosis        Nutrition Diagnosis  Patient has Nutrition Diagnosis: Yes  Diagnosis Status (1): New  Nutrition Diagnosis 1: Altered GI function  Related to (1): compromised exocrine function   and compromised structure and function of GI tract  As Evidenced by (1): abnormal renal panel       Nutrition Interventions/Recommendations   Nutrition prescription for oral nutrition    Nutrition Recommendations:  Individualized Nutrition Prescription Provided for : Continue regular diet,  sending Ensure plus high protein at breakfast to help meet nutritional needs    Nutrition Interventions/Goals:   Goal: >75% of meals  Medical Food Supplement: Commercial beverage medical food supplement therapy  Goal: 100% of supplement  Coordination of Care with Providers: Nursing, Provider      Education Documentation  No documentation found.     Not at this time       Nutrition Monitoring and Evaluation   Food/Nutrient Related History Monitoring  Monitoring and Evaluation Plan: Estimated Energy Intake, Fluid intake, Intake / amount of food  Estimated Energy Intake: Energy intake greater or equal to 75% of estimated energy needs  Fluid Intake:  (adequate fluid intake without fluid overload)  Intake / Amount of food: Consumes at least 75% or more of meals/snacks/supplements, Meets > 75% estimated energy needs    Anthropometric  Measurements  Monitoring and Evaluation Plan: Body weight    Biochemical Data, Medical Tests and Procedures  Monitoring and Evaluation Plan: Electrolyte/renal panel, Glucose/endocrine profile  Criteria: improved BMP  Criteria: glucose within desired range              Time Spent (min): 45 minutes

## 2025-04-16 PROBLEM — E86.1 HYPOVOLEMIA: Status: RESOLVED | Noted: 2025-04-12 | Resolved: 2025-04-16

## 2025-04-16 PROBLEM — N17.9 AKI (ACUTE KIDNEY INJURY): Status: RESOLVED | Noted: 2025-04-12 | Resolved: 2025-04-16

## 2025-04-16 LAB
ALBUMIN SERPL BCP-MCNC: 3.6 G/DL (ref 3.4–5)
ANION GAP SERPL CALC-SCNC: 11 MMOL/L (ref 10–20)
ATRIAL RATE: 90 BPM
BUN SERPL-MCNC: 45 MG/DL (ref 6–23)
CALCIUM SERPL-MCNC: 8.4 MG/DL (ref 8.6–10.3)
CHLORIDE SERPL-SCNC: 109 MMOL/L (ref 98–107)
CO2 SERPL-SCNC: 23 MMOL/L (ref 21–32)
CREAT SERPL-MCNC: 1.37 MG/DL (ref 0.5–1.3)
EGFRCR SERPLBLD CKD-EPI 2021: 54 ML/MIN/1.73M*2
GLUCOSE SERPL-MCNC: 93 MG/DL (ref 74–99)
HOLD SPECIMEN: NORMAL
P AXIS: 77 DEGREES
P OFFSET: 182 MS
P ONSET: 128 MS
PHOSPHATE SERPL-MCNC: 2.2 MG/DL (ref 2.5–4.9)
POTASSIUM SERPL-SCNC: 3.9 MMOL/L (ref 3.5–5.3)
PR INTERVAL: 162 MS
Q ONSET: 209 MS
QRS COUNT: 15 BEATS
QRS DURATION: 102 MS
QT INTERVAL: 362 MS
QTC CALCULATION(BAZETT): 442 MS
QTC FREDERICIA: 414 MS
R AXIS: -34 DEGREES
SODIUM SERPL-SCNC: 139 MMOL/L (ref 136–145)
T AXIS: 33 DEGREES
T OFFSET: 390 MS
VENTRICULAR RATE: 90 BPM

## 2025-04-16 PROCEDURE — 2500000004 HC RX 250 GENERAL PHARMACY W/ HCPCS (ALT 636 FOR OP/ED): Performed by: INTERNAL MEDICINE

## 2025-04-16 PROCEDURE — 2500000002 HC RX 250 W HCPCS SELF ADMINISTERED DRUGS (ALT 637 FOR MEDICARE OP, ALT 636 FOR OP/ED): Performed by: INTERNAL MEDICINE

## 2025-04-16 PROCEDURE — 36415 COLL VENOUS BLD VENIPUNCTURE: CPT | Performed by: INTERNAL MEDICINE

## 2025-04-16 PROCEDURE — 2500000002 HC RX 250 W HCPCS SELF ADMINISTERED DRUGS (ALT 637 FOR MEDICARE OP, ALT 636 FOR OP/ED): Performed by: FAMILY MEDICINE

## 2025-04-16 PROCEDURE — 2500000001 HC RX 250 WO HCPCS SELF ADMINISTERED DRUGS (ALT 637 FOR MEDICARE OP): Performed by: INTERNAL MEDICINE

## 2025-04-16 PROCEDURE — 99239 HOSP IP/OBS DSCHRG MGMT >30: CPT | Performed by: INTERNAL MEDICINE

## 2025-04-16 PROCEDURE — 2060000001 HC INTERMEDIATE ICU ROOM DAILY

## 2025-04-16 PROCEDURE — 2500000005 HC RX 250 GENERAL PHARMACY W/O HCPCS: Performed by: INTERNAL MEDICINE

## 2025-04-16 PROCEDURE — 80069 RENAL FUNCTION PANEL: CPT | Performed by: INTERNAL MEDICINE

## 2025-04-16 RX ORDER — LIDOCAINE 560 MG/1
1 PATCH PERCUTANEOUS; TOPICAL; TRANSDERMAL DAILY
Status: DISCONTINUED | OUTPATIENT
Start: 2025-04-16 | End: 2025-04-19 | Stop reason: HOSPADM

## 2025-04-16 RX ADMIN — MIRTAZAPINE 15 MG: 15 TABLET, FILM COATED ORAL at 21:07

## 2025-04-16 RX ADMIN — PAROXETINE HYDROCHLORIDE 40 MG: 20 TABLET, FILM COATED ORAL at 09:00

## 2025-04-16 RX ADMIN — ACETAMINOPHEN 650 MG: 325 TABLET, FILM COATED ORAL at 15:34

## 2025-04-16 RX ADMIN — HEPARIN SODIUM 5000 UNITS: 5000 INJECTION INTRAVENOUS; SUBCUTANEOUS at 21:07

## 2025-04-16 RX ADMIN — HEPARIN SODIUM 5000 UNITS: 5000 INJECTION INTRAVENOUS; SUBCUTANEOUS at 06:27

## 2025-04-16 RX ADMIN — TRAZODONE HYDROCHLORIDE 50 MG: 50 TABLET ORAL at 21:14

## 2025-04-16 RX ADMIN — HEPARIN SODIUM 5000 UNITS: 5000 INJECTION INTRAVENOUS; SUBCUTANEOUS at 14:00

## 2025-04-16 RX ADMIN — PANTOPRAZOLE SODIUM 40 MG: 40 TABLET, DELAYED RELEASE ORAL at 06:26

## 2025-04-16 RX ADMIN — LIDOCAINE 1 PATCH: 4 PATCH TOPICAL at 15:38

## 2025-04-16 RX ADMIN — ACETAMINOPHEN 650 MG: 325 TABLET, FILM COATED ORAL at 21:14

## 2025-04-16 RX ADMIN — TAMSULOSIN HYDROCHLORIDE 0.4 MG: 0.4 CAPSULE ORAL at 09:00

## 2025-04-16 RX ADMIN — ACETAMINOPHEN 650 MG: 325 TABLET, FILM COATED ORAL at 10:46

## 2025-04-16 RX ADMIN — ARIPIPRAZOLE 2 MG: 2 TABLET ORAL at 09:00

## 2025-04-16 ASSESSMENT — COGNITIVE AND FUNCTIONAL STATUS - GENERAL
HELP NEEDED FOR BATHING: A LITTLE
TOILETING: A LITTLE
STANDING UP FROM CHAIR USING ARMS: A LITTLE
CLIMB 3 TO 5 STEPS WITH RAILING: A LOT
PERSONAL GROOMING: A LITTLE
MOBILITY SCORE: 19
DRESSING REGULAR UPPER BODY CLOTHING: A LITTLE
DRESSING REGULAR UPPER BODY CLOTHING: A LITTLE
WALKING IN HOSPITAL ROOM: A LITTLE
TOILETING: A LITTLE
CLIMB 3 TO 5 STEPS WITH RAILING: A LITTLE
DAILY ACTIVITIY SCORE: 19
WALKING IN HOSPITAL ROOM: A LITTLE
DRESSING REGULAR LOWER BODY CLOTHING: A LITTLE
STANDING UP FROM CHAIR USING ARMS: A LITTLE
EATING MEALS: A LITTLE
PERSONAL GROOMING: A LITTLE
MOVING TO AND FROM BED TO CHAIR: A LITTLE
DRESSING REGULAR LOWER BODY CLOTHING: A LITTLE
MOBILITY SCORE: 20
MOVING TO AND FROM BED TO CHAIR: A LITTLE
HELP NEEDED FOR BATHING: A LITTLE
DAILY ACTIVITIY SCORE: 18

## 2025-04-16 ASSESSMENT — PAIN SCALES - PAIN ASSESSMENT IN ADVANCED DEMENTIA (PAINAD)
TOTALSCORE: MEDICATION (SEE MAR)
TOTALSCORE: MEDICATION (SEE MAR)

## 2025-04-16 ASSESSMENT — PAIN - FUNCTIONAL ASSESSMENT
PAIN_FUNCTIONAL_ASSESSMENT: 0-10

## 2025-04-16 ASSESSMENT — PAIN SCALES - GENERAL
PAINLEVEL_OUTOF10: 6
PAINLEVEL_OUTOF10: 3
PAINLEVEL_OUTOF10: 8
PAINLEVEL_OUTOF10: 0 - NO PAIN
PAINLEVEL_OUTOF10: 5 - MODERATE PAIN

## 2025-04-16 ASSESSMENT — PAIN DESCRIPTION - LOCATION
LOCATION: KNEE

## 2025-04-16 NOTE — CARE PLAN
The patient's goals for the shift include      The clinical goals for the shift include patient will remain hemodynamically stable and refrain from further complications    Over the shift, the patient will be turned and repositioned to promote skin integrity and prevent breakdown

## 2025-04-16 NOTE — PROGRESS NOTES
Nephrology Consult Progress Note    Antoine Kimball is a 74 y.o. male on day 4 of admission presenting with ALFRED (acute kidney injury).      Subjective   No acute events overnight, improved stoma output, denies abd pain, new L knee pain       Objective          Physical Exam    Vitals 24HR  Heart Rate:  [63-80]   Temp:  [36.6 °C (97.9 °F)-37.5 °C (99.5 °F)]   Resp:  [18]   BP: ()/(50-59)   SpO2:  [92 %-95 %]         AAOx3  Tachycardic, murmur+  CTA BL  Abd soft, + BS, ileostomy +  No edema           I&O 24HR    Intake/Output Summary (Last 24 hours) at 4/16/2025 1500  Last data filed at 4/16/2025 1311  Gross per 24 hour   Intake 2698.75 ml   Output 2280 ml   Net 418.75 ml         Medications  Medications Prior to Admission   Medication Sig Dispense Refill Last Dose/Taking    ARIPiprazole (Abilify) 2 mg tablet Take 1 tablet (2 mg) by mouth once daily.   4/12/2025 Morning    cyanocobalamin, vitamin B-12, 2,500 mcg tablet, sublingual SL tablet Place 1 tablet (2,500 mcg) under the tongue 2 times a week. Every Wednesday and Friday 4/11/2025    ergocalciferol (Vitamin D-2) 1250 mcg (50,000 units) capsule Take 1 capsule (1.25 mg) by mouth 2 times a week. Every Thursday and Dread   4/10/2025    mirtazapine (Remeron) 15 mg tablet Take 1 tablet (15 mg) by mouth once daily at bedtime.   4/11/2025 Evening    multivit-min/folic acid/lutein (CENTRUM SILVER ORAL) Take 1 tablet by mouth once daily.   4/12/2025 Morning    omeprazole (PriLOSEC) 20 mg DR capsule Take 1 capsule (20 mg) by mouth once daily at bedtime.   4/11/2025 Evening    PARoxetine (Paxil) 40 mg tablet Take 1 tablet (40 mg) by mouth once daily in the morning.   4/12/2025 Morning    tamsulosin (Flomax) 0.4 mg 24 hr capsule Take 1 capsule (0.4 mg) by mouth once daily in the morning.   4/12/2025 Morning      Scheduled medications  ARIPiprazole, 2 mg, oral, Daily  heparin (porcine), 5,000 Units, subcutaneous, q8h ERIN  lidocaine, 1 patch, transdermal,  Daily  mirtazapine, 15 mg, oral, Nightly  pantoprazole, 40 mg, oral, Daily before breakfast  PARoxetine, 40 mg, oral, q AM  tamsulosin, 0.4 mg, oral, q AM      Continuous medications  sodium chloride 0.9%, 100 mL/hr, Last Rate: 100 mL/hr (04/16/25 1311)      PRN medications  PRN medications: acetaminophen **OR** acetaminophen **OR** acetaminophen, ondansetron **OR** ondansetron, traZODone    Relevant Results      Admission on 04/12/2025   Component Date Value Ref Range Status    WBC 04/12/2025 15.0 (H)  4.4 - 11.3 x10*3/uL Final    nRBC 04/12/2025 0.0  0.0 - 0.0 /100 WBCs Final    RBC 04/12/2025 5.35  4.50 - 5.90 x10*6/uL Final    Hemoglobin 04/12/2025 16.4  13.5 - 17.5 g/dL Final    Hematocrit 04/12/2025 52.1 (H)  41.0 - 52.0 % Final    MCV 04/12/2025 97  80 - 100 fL Final    MCH 04/12/2025 30.7  26.0 - 34.0 pg Final    MCHC 04/12/2025 31.5 (L)  32.0 - 36.0 g/dL Final    RDW 04/12/2025 13.3  11.5 - 14.5 % Final    Platelets 04/12/2025 296  150 - 450 x10*3/uL Final    Neutrophils % 04/12/2025 82.5  40.0 - 80.0 % Final    Immature Granulocytes %, Automated 04/12/2025 0.7  0.0 - 0.9 % Final    Immature Granulocyte Count (IG) includes promyelocytes, myelocytes and metamyelocytes but does not include bands. Percent differential counts (%) should be interpreted in the context of the absolute cell counts (cells/UL).    Lymphocytes % 04/12/2025 6.6  13.0 - 44.0 % Final    Monocytes % 04/12/2025 9.9  2.0 - 10.0 % Final    Eosinophils % 04/12/2025 0.1  0.0 - 6.0 % Final    Basophils % 04/12/2025 0.2  0.0 - 2.0 % Final    Neutrophils Absolute 04/12/2025 12.39 (H)  1.60 - 5.50 x10*3/uL Final    Percent differential counts (%) should be interpreted in the context of the absolute cell counts (cells/uL).    Immature Granulocytes Absolute, Au* 04/12/2025 0.11  0.00 - 0.50 x10*3/uL Final    Lymphocytes Absolute 04/12/2025 0.99  0.80 - 3.00 x10*3/uL Final    Monocytes Absolute 04/12/2025 1.49 (H)  0.05 - 0.80 x10*3/uL Final     Eosinophils Absolute 04/12/2025 0.01  0.00 - 0.40 x10*3/uL Final    Basophils Absolute 04/12/2025 0.03  0.00 - 0.10 x10*3/uL Final    Glucose 04/12/2025 139 (H)  74 - 99 mg/dL Final    Sodium 04/12/2025 135 (L)  136 - 145 mmol/L Final    Potassium 04/12/2025 4.5  3.5 - 5.3 mmol/L Final    MILD HEMOLYSIS DETECTED. The result may be falsely elevated due to hemolysis or other interferents. Clinical correlation is recommended. Repeat testing may be considered.    Chloride 04/12/2025 95 (L)  98 - 107 mmol/L Final    Bicarbonate 04/12/2025 13 (L)  21 - 32 mmol/L Final    Anion Gap 04/12/2025 32 (H)  10 - 20 mmol/L Final    Urea Nitrogen 04/12/2025 71 (H)  6 - 23 mg/dL Final    Creatinine 04/12/2025 5.88 (H)  0.50 - 1.30 mg/dL Final    eGFR 04/12/2025 9 (L)  >60 mL/min/1.73m*2 Final    Calculations of estimated GFR are performed using the 2021 CKD-EPI Study Refit equation without the race variable for the IDMS-Traceable creatinine methods.  https://jasn.asnjournals.org/content/early/2021/09/22/ASN.2773732459    Calcium 04/12/2025 10.5 (H)  8.6 - 10.3 mg/dL Final    Albumin 04/12/2025 5.4 (H)  3.4 - 5.0 g/dL Final    Alkaline Phosphatase 04/12/2025 125  33 - 136 U/L Final    Total Protein 04/12/2025 9.8 (H)  6.4 - 8.2 g/dL Final    AST 04/12/2025 67 (H)  9 - 39 U/L Final    MILD HEMOLYSIS DETECTED. The result may be falsely elevated due to hemolysis or other interferents. Clinical correlation is recommended. Repeat testing may be considered.    Bilirubin, Total 04/12/2025 0.8  0.0 - 1.2 mg/dL Final    ALT 04/12/2025 51  10 - 52 U/L Final    Patients treated with Sulfasalazine may generate falsely decreased results for ALT.    Lipase 04/12/2025 45  9 - 82 U/L Final    Magnesium 04/12/2025 2.18  1.60 - 2.40 mg/dL Final    Ventricular Rate 04/12/2025 90  BPM Preliminary    Atrial Rate 04/12/2025 90  BPM Preliminary    RI Interval 04/12/2025 162  ms Preliminary    QRS Duration 04/12/2025 102  ms Preliminary    QT Interval  04/12/2025 362  ms Preliminary    QTC Calculation(Bazett) 04/12/2025 442  ms Preliminary    P Axis 04/12/2025 77  degrees Preliminary    R Axis 04/12/2025 -34  degrees Preliminary    T Axis 04/12/2025 33  degrees Preliminary    QRS Count 04/12/2025 15  beats Preliminary    Q Onset 04/12/2025 209  ms Preliminary    P Onset 04/12/2025 128  ms Preliminary    P Offset 04/12/2025 182  ms Preliminary    T Offset 04/12/2025 390  ms Preliminary    QTC Fredericia 04/12/2025 414  ms Preliminary    Extra Tube 04/12/2025 Hold for add-ons.   Final    Auto resulted.    POCT pH, Venous 04/12/2025 7.37  7.33 - 7.43 pH Final    POCT pCO2, Venous 04/12/2025 23 (L)  41 - 51 mm Hg Final    POCT pO2, Venous 04/12/2025 69 (H)  35 - 45 mm Hg Final    POCT SO2, Venous 04/12/2025 96 (H)  45 - 75 % Final    POCT Oxy Hemoglobin, Venous 04/12/2025 91.9 (H)  45.0 - 75.0 % Final    POCT Hematocrit Calculated, Venous 04/12/2025 47.0  41.0 - 52.0 % Final    POCT Sodium, Venous 04/12/2025 127 (L)  136 - 145 mmol/L Final    POCT Potassium, Venous 04/12/2025 8.1 (HH)  3.5 - 5.3 mmol/L Final    POCT Chloride, Venous 04/12/2025 103  98 - 107 mmol/L Final    POCT Ionized Calicum, Venous 04/12/2025 0.96 (L)  1.10 - 1.33 mmol/L Final    POCT Glucose, Venous 04/12/2025 114 (H)  74 - 99 mg/dL Final    POCT Lactate, Venous 04/12/2025 2.2 (H)  0.4 - 2.0 mmol/L Final    POCT Base Excess, Venous 04/12/2025 -9.7 (L)  -2.0 - 3.0 mmol/L Final    POCT HCO3 Calculated, Venous 04/12/2025 13.3 (L)  22.0 - 26.0 mmol/L Final    POCT Hemoglobin, Venous 04/12/2025 15.8  13.5 - 17.5 g/dL Final    POCT Anion Gap, Venous 04/12/2025 19.0  10.0 - 25.0 mmol/L Final    Patient Temperature 04/12/2025 37.0  degrees Celsius Final    FiO2 04/12/2025 21  % Final    Critical Called By 04/12/2025 REYNA FRANK RRT   Final    Critical Called To 04/12/2025 DR DELA CRUZ   Final    Critical Call Time 04/12/2025 2006   Final    Critical Read Back 04/12/2025 Y   Final    WBC 04/13/2025 10.9  4.4 -  11.3 x10*3/uL Final    nRBC 04/13/2025 0.0  0.0 - 0.0 /100 WBCs Final    RBC 04/13/2025 4.68  4.50 - 5.90 x10*6/uL Final    Hemoglobin 04/13/2025 14.1  13.5 - 17.5 g/dL Final    Hematocrit 04/13/2025 43.2  41.0 - 52.0 % Final    MCV 04/13/2025 92  80 - 100 fL Final    MCH 04/13/2025 30.1  26.0 - 34.0 pg Final    MCHC 04/13/2025 32.6  32.0 - 36.0 g/dL Final    RDW 04/13/2025 13.2  11.5 - 14.5 % Final    Platelets 04/13/2025 304  150 - 450 x10*3/uL Final    Glucose 04/13/2025 87  74 - 99 mg/dL Final    Sodium 04/13/2025 137  136 - 145 mmol/L Final    Potassium 04/13/2025 4.2  3.5 - 5.3 mmol/L Final    Chloride 04/13/2025 99  98 - 107 mmol/L Final    Bicarbonate 04/13/2025 18 (L)  21 - 32 mmol/L Final    Anion Gap 04/13/2025 24 (H)  10 - 20 mmol/L Final    Urea Nitrogen 04/13/2025 83 (H)  6 - 23 mg/dL Final    Creatinine 04/13/2025 4.95 (H)  0.50 - 1.30 mg/dL Final    eGFR 04/13/2025 12 (L)  >60 mL/min/1.73m*2 Final    Calculations of estimated GFR are performed using the 2021 CKD-EPI Study Refit equation without the race variable for the IDMS-Traceable creatinine methods.  https://jasn.asnjournals.org/content/early/2021/09/22/ASN.0814916296    Calcium 04/13/2025 9.3  8.6 - 10.3 mg/dL Final    Sodium, Urine Random 04/13/2025 12  mmol/L Final    Creatinine, Urine Random 04/13/2025 259.5  20.0 - 370.0 mg/dL Final    Sodium/Creatinine Ratio 04/13/2025 5  Not established. mmol/g Creat Final    C-Reactive Protein 04/13/2025 2.21 (H)  <1.00 mg/dL Final    Sedimentation Rate 04/13/2025 88 (H)  0 - 20 mm/h Final    Campylobacter Group 04/13/2025 Not Detected  Not Detected Final    Salmonella species 04/13/2025 Not Detected  Not Detected Final    Shigella species 04/13/2025 Not Detected  Not Detected Final    Vibrio Group 04/13/2025 Not Detected  Not Detected Final    Yersinia Enterocolitica 04/13/2025 Not Detected  Not Detected Final    Shiga Toxin 1 04/13/2025 Not Detected  Not Detected Final    Shiga Toxin 2 04/13/2025 Not  Detected  Not Detected Final    Norovirus GI/GII 04/13/2025 Not Detected  Not Detected Final    Rotavirus A 04/13/2025 Detected (A)  Not Detected Final    C. difficile, PCR 04/13/2025 Not Detected  Not Detected Final    WBC 04/14/2025 8.1  4.4 - 11.3 x10*3/uL Final    nRBC 04/14/2025 0.0  0.0 - 0.0 /100 WBCs Final    RBC 04/14/2025 4.40 (L)  4.50 - 5.90 x10*6/uL Final    Hemoglobin 04/14/2025 13.3 (L)  13.5 - 17.5 g/dL Final    Hematocrit 04/14/2025 41.3  41.0 - 52.0 % Final    MCV 04/14/2025 94  80 - 100 fL Final    MCH 04/14/2025 30.2  26.0 - 34.0 pg Final    MCHC 04/14/2025 32.2  32.0 - 36.0 g/dL Final    RDW 04/14/2025 13.3  11.5 - 14.5 % Final    Platelets 04/14/2025 254  150 - 450 x10*3/uL Final    Glucose 04/14/2025 97  74 - 99 mg/dL Final    Sodium 04/14/2025 138  136 - 145 mmol/L Final    Potassium 04/14/2025 3.5  3.5 - 5.3 mmol/L Final    Chloride 04/14/2025 104  98 - 107 mmol/L Final    Bicarbonate 04/14/2025 22  21 - 32 mmol/L Final    Anion Gap 04/14/2025 16  10 - 20 mmol/L Final    Urea Nitrogen 04/14/2025 80 (H)  6 - 23 mg/dL Final    Creatinine 04/14/2025 2.87 (H)  0.50 - 1.30 mg/dL Final    eGFR 04/14/2025 22 (L)  >60 mL/min/1.73m*2 Final    Calculations of estimated GFR are performed using the 2021 CKD-EPI Study Refit equation without the race variable for the IDMS-Traceable creatinine methods.  https://jasn.asnjournals.org/content/early/2021/09/22/ASN.0883796376    Calcium 04/14/2025 8.9  8.6 - 10.3 mg/dL Final    Glucose 04/15/2025 172 (H)  74 - 99 mg/dL Final    Sodium 04/15/2025 140  136 - 145 mmol/L Final    Potassium 04/15/2025 3.5  3.5 - 5.3 mmol/L Final    Chloride 04/15/2025 108 (H)  98 - 107 mmol/L Final    Bicarbonate 04/15/2025 23  21 - 32 mmol/L Final    Anion Gap 04/15/2025 13  10 - 20 mmol/L Final    Urea Nitrogen 04/15/2025 69 (H)  6 - 23 mg/dL Final    Creatinine 04/15/2025 1.83 (H)  0.50 - 1.30 mg/dL Final    eGFR 04/15/2025 38 (L)  >60 mL/min/1.73m*2 Final    Calculations of  estimated GFR are performed using the 2021 CKD-EPI Study Refit equation without the race variable for the IDMS-Traceable creatinine methods.  https://jasn.asnjournals.org/content/early/2021/09/22/ASN.7903351342    Calcium 04/15/2025 8.7  8.6 - 10.3 mg/dL Final    Phosphorus 04/15/2025 2.1 (L)  2.5 - 4.9 mg/dL Final    The performance characteristics of phosphorus testing in heparinized plasma have been validated by the individual  laboratory site where testing is performed. Testing on heparinized plasma is not approved by the FDA; however, such approval is not necessary.    Albumin 04/15/2025 3.8  3.4 - 5.0 g/dL Final    Extra Tube 04/15/2025 Hold for add-ons.   Final    Auto resulted.    Glucose 04/16/2025 93  74 - 99 mg/dL Final    Sodium 04/16/2025 139  136 - 145 mmol/L Final    Potassium 04/16/2025 3.9  3.5 - 5.3 mmol/L Final    Chloride 04/16/2025 109 (H)  98 - 107 mmol/L Final    Bicarbonate 04/16/2025 23  21 - 32 mmol/L Final    Anion Gap 04/16/2025 11  10 - 20 mmol/L Final    Urea Nitrogen 04/16/2025 45 (H)  6 - 23 mg/dL Final    Creatinine 04/16/2025 1.37 (H)  0.50 - 1.30 mg/dL Final    eGFR 04/16/2025 54 (L)  >60 mL/min/1.73m*2 Final    Calculations of estimated GFR are performed using the 2021 CKD-EPI Study Refit equation without the race variable for the IDMS-Traceable creatinine methods.  https://jasn.asnjournals.org/content/early/2021/09/22/ASN.5201623485    Calcium 04/16/2025 8.4 (L)  8.6 - 10.3 mg/dL Final    Phosphorus 04/16/2025 2.2 (L)  2.5 - 4.9 mg/dL Final    The performance characteristics of phosphorus testing in heparinized plasma have been validated by the individual  laboratory site where testing is performed. Testing on heparinized plasma is not approved by the FDA; however, such approval is not necessary.    Albumin 04/16/2025 3.6  3.4 - 5.0 g/dL Final    Extra Tube 04/16/2025 Hold for add-ons.   Final    Auto resulted.      Imaging  CT abdomen pelvis wo IV contrast  Result Date:  4/12/2025  Cholelithiasis. Evaluation of the gallbladder is otherwise limited, and if the patient is experiencing right upper quadrant pain or there is otherwise concern for acute gallbladder pathology, ultrasound may be obtained for further evaluation.   Postsurgical change of prior bowel resection and right side ostomy. Large left inguinal hernia with herniation of multiple bowel loops. No evidence of bowel obstruction.   Large 4.4 cm calculus in the urinary bladder.   MACRO: None   Signed by: Parker Cooper 4/12/2025 6:26 PM Dictation workstation:   [id number]      Cardiology, Vascular, and Other Imaging  ECG 12 lead  Result Date: 4/14/2025  Normal sinus rhythm Left axis deviation Minimal voltage criteria for LVH, may be normal variant ( R in aVL ) Cannot rule out Anterior infarct , age undetermined Abnormal ECG No previous ECGs available          ASSESSMENT AND PLAN    74 y.o. male with PMH of Crohn disease status post ileostomy, BPH presenting with increased output from his ileostomy , found to have severe ALFRED     ALFRED on CKD, baseline creatinine of 1.3, was 5.88 on admission, resolved  Prerenal from high stoma output w/ s/o hemoconcentration on presentation     K stable  Mild HCa in setting of ALFRED  AGMA and NAGMA from gi losses     Hx of HTN here BP borderline low        Plan  - resolved ALFRED, K stable, resolved Hca  - noted high CRP, ESR, rotavirus+  - large bladder calculus, check PVRs, continue daily flomax  - R knee not inflamed, tylenol and lidocaine TD  - fup in 1-2 weeks with dr Garcia, will arrange            Thank you for the opportunity to assist in the care of this patient, please call with questions  Carley Garcia MD PhD

## 2025-04-16 NOTE — CARE PLAN
The patient's goals for the shift include      The clinical goals for the shift include patient will re main hemodynamically stable and will refrain from further complications    Over the shift, the patient did not make progress toward the following goals. Barriers to progression include . Recommendations to address these barriers include .

## 2025-04-16 NOTE — DISCHARGE SUMMARY
Discharge Diagnosis  ALFRED (acute kidney injury)    Issues Requiring Follow-Up  PCP in one week.  Follow up with nephrology as scheduled.    Discharge Meds     Medication List      CONTINUE taking these medications     ARIPiprazole 2 mg tablet; Commonly known as: Abilify   CENTRUM SILVER ORAL   cyanocobalamin (vitamin B-12) 2,500 mcg tablet, sublingual SL tablet   ergocalciferol 1250 mcg (50,000 units) capsule; Commonly known as:   Vitamin D-2   mirtazapine 15 mg tablet; Commonly known as: Remeron   omeprazole 20 mg DR capsule; Commonly known as: PriLOSEC   PARoxetine 40 mg tablet; Commonly known as: Paxil   tamsulosin 0.4 mg 24 hr capsule; Commonly known as: Flomax       Test Results Pending At Discharge  Pending Labs       No current pending labs.            Hospital Course  Antoine Kimball is a 74 y.o. male PMHx Crohn disease status post ileostomy, BPH presented to [facility] emergency department for increased output from his ileostomy present for last couple days. Patient found with ALFRED and rotavirus infection.  Treated with IV fluids and ALFRED resolved.  Bowel function also returned to normal. Patient to follow up with PCP, nephrology and his usual urologist at [facility].  More than 30 minutes on discharge planning.    Pertinent Physical Exam At Time of Discharge  Physical Exam  Vitals reviewed.   Constitutional:       Appearance: Normal appearance.   HENT:      Head: Normocephalic and atraumatic.      Mouth/Throat:      Mouth: Mucous membranes are moist.   Eyes:      Conjunctiva/sclera: Conjunctivae normal.   Cardiovascular:      Rate and Rhythm: Normal rate.      Pulses: Normal pulses.   Pulmonary:      Effort: Pulmonary effort is normal.      Breath sounds: Normal breath sounds. No wheezing.   Abdominal:      General: Abdomen is flat. There is no distension.      Palpations: Abdomen is soft.      Tenderness: There is no guarding.   Musculoskeletal:         General: No swelling. Normal range of motion.   Skin:     General:  Skin is warm and dry.   Neurological:      General: No focal deficit present.      Mental Status: He is alert. Mental status is at baseline.   Psychiatric:         Mood and Affect: Mood normal.         Behavior: Behavior normal.         Outpatient Follow-Up  No future appointments.      Aaron Arizmendi MD

## 2025-04-17 LAB
ALBUMIN SERPL BCP-MCNC: 3.5 G/DL (ref 3.4–5)
ANION GAP SERPL CALC-SCNC: 12 MMOL/L (ref 10–20)
BUN SERPL-MCNC: 31 MG/DL (ref 6–23)
CALCIUM SERPL-MCNC: 8.5 MG/DL (ref 8.6–10.3)
CHLORIDE SERPL-SCNC: 110 MMOL/L (ref 98–107)
CO2 SERPL-SCNC: 21 MMOL/L (ref 21–32)
CREAT SERPL-MCNC: 1.19 MG/DL (ref 0.5–1.3)
EGFRCR SERPLBLD CKD-EPI 2021: 64 ML/MIN/1.73M*2
GLUCOSE SERPL-MCNC: 88 MG/DL (ref 74–99)
HOLD SPECIMEN: NORMAL
PHOSPHATE SERPL-MCNC: 2.3 MG/DL (ref 2.5–4.9)
POTASSIUM SERPL-SCNC: 4 MMOL/L (ref 3.5–5.3)
SODIUM SERPL-SCNC: 139 MMOL/L (ref 136–145)
URATE SERPL-MCNC: 6.4 MG/DL (ref 4–7.5)

## 2025-04-17 PROCEDURE — 36415 COLL VENOUS BLD VENIPUNCTURE: CPT | Performed by: INTERNAL MEDICINE

## 2025-04-17 PROCEDURE — 2500000002 HC RX 250 W HCPCS SELF ADMINISTERED DRUGS (ALT 637 FOR MEDICARE OP, ALT 636 FOR OP/ED): Performed by: INTERNAL MEDICINE

## 2025-04-17 PROCEDURE — 2500000004 HC RX 250 GENERAL PHARMACY W/ HCPCS (ALT 636 FOR OP/ED): Performed by: INTERNAL MEDICINE

## 2025-04-17 PROCEDURE — 2500000002 HC RX 250 W HCPCS SELF ADMINISTERED DRUGS (ALT 637 FOR MEDICARE OP, ALT 636 FOR OP/ED): Performed by: FAMILY MEDICINE

## 2025-04-17 PROCEDURE — 97161 PT EVAL LOW COMPLEX 20 MIN: CPT | Mod: GP

## 2025-04-17 PROCEDURE — 2060000001 HC INTERMEDIATE ICU ROOM DAILY

## 2025-04-17 PROCEDURE — 80069 RENAL FUNCTION PANEL: CPT | Performed by: INTERNAL MEDICINE

## 2025-04-17 PROCEDURE — 97165 OT EVAL LOW COMPLEX 30 MIN: CPT | Mod: GO

## 2025-04-17 PROCEDURE — 2500000005 HC RX 250 GENERAL PHARMACY W/O HCPCS: Performed by: INTERNAL MEDICINE

## 2025-04-17 PROCEDURE — 2500000001 HC RX 250 WO HCPCS SELF ADMINISTERED DRUGS (ALT 637 FOR MEDICARE OP): Performed by: INTERNAL MEDICINE

## 2025-04-17 PROCEDURE — 84550 ASSAY OF BLOOD/URIC ACID: CPT | Performed by: INTERNAL MEDICINE

## 2025-04-17 RX ADMIN — PANTOPRAZOLE SODIUM 40 MG: 40 TABLET, DELAYED RELEASE ORAL at 09:06

## 2025-04-17 RX ADMIN — MIRTAZAPINE 15 MG: 15 TABLET, FILM COATED ORAL at 20:01

## 2025-04-17 RX ADMIN — ACETAMINOPHEN 650 MG: 325 TABLET, FILM COATED ORAL at 20:00

## 2025-04-17 RX ADMIN — HEPARIN SODIUM 5000 UNITS: 5000 INJECTION INTRAVENOUS; SUBCUTANEOUS at 14:19

## 2025-04-17 RX ADMIN — TAMSULOSIN HYDROCHLORIDE 0.4 MG: 0.4 CAPSULE ORAL at 09:06

## 2025-04-17 RX ADMIN — LIDOCAINE 1 PATCH: 4 PATCH TOPICAL at 09:07

## 2025-04-17 RX ADMIN — ARIPIPRAZOLE 2 MG: 2 TABLET ORAL at 09:06

## 2025-04-17 RX ADMIN — PAROXETINE HYDROCHLORIDE 40 MG: 20 TABLET, FILM COATED ORAL at 09:05

## 2025-04-17 RX ADMIN — HEPARIN SODIUM 5000 UNITS: 5000 INJECTION INTRAVENOUS; SUBCUTANEOUS at 20:02

## 2025-04-17 RX ADMIN — HEPARIN SODIUM 5000 UNITS: 5000 INJECTION INTRAVENOUS; SUBCUTANEOUS at 05:35

## 2025-04-17 RX ADMIN — TRAZODONE HYDROCHLORIDE 50 MG: 50 TABLET ORAL at 20:01

## 2025-04-17 ASSESSMENT — COGNITIVE AND FUNCTIONAL STATUS - GENERAL
HELP NEEDED FOR BATHING: A LOT
MOVING TO AND FROM BED TO CHAIR: A LOT
MOVING FROM LYING ON BACK TO SITTING ON SIDE OF FLAT BED WITH BEDRAILS: A LITTLE
MOBILITY SCORE: 11
DRESSING REGULAR LOWER BODY CLOTHING: A LOT
TOILETING: A LOT
MOVING TO AND FROM BED TO CHAIR: A LITTLE
DRESSING REGULAR LOWER BODY CLOTHING: A LITTLE
STANDING UP FROM CHAIR USING ARMS: A LOT
DRESSING REGULAR UPPER BODY CLOTHING: A LITTLE
DRESSING REGULAR UPPER BODY CLOTHING: A LOT
WALKING IN HOSPITAL ROOM: TOTAL
HELP NEEDED FOR BATHING: A LITTLE
PERSONAL GROOMING: A LOT
STANDING UP FROM CHAIR USING ARMS: A LITTLE
PERSONAL GROOMING: A LITTLE
DAILY ACTIVITIY SCORE: 19
TOILETING: A LITTLE
TURNING FROM BACK TO SIDE WHILE IN FLAT BAD: A LOT
WALKING IN HOSPITAL ROOM: A LITTLE
CLIMB 3 TO 5 STEPS WITH RAILING: A LITTLE
CLIMB 3 TO 5 STEPS WITH RAILING: TOTAL
DAILY ACTIVITIY SCORE: 14
MOBILITY SCORE: 20

## 2025-04-17 ASSESSMENT — PAIN SCALES - GENERAL
PAINLEVEL_OUTOF10: 3
PAINLEVEL_OUTOF10: 0 - NO PAIN

## 2025-04-17 ASSESSMENT — PAIN - FUNCTIONAL ASSESSMENT
PAIN_FUNCTIONAL_ASSESSMENT: 0-10

## 2025-04-17 NOTE — PROGRESS NOTES
Met with patient at bedside to follow up on discharge plan. Patient states he lives at home, independent prior to admission. Address, insurance and contact information verified. Discussed [facility] with patient, patient agreeable. PT/OT evaluation is pending.

## 2025-04-17 NOTE — PROGRESS NOTES
Occupational Therapy    Evaluation    Patient Name: Antoine Kimball  MRN: [id number]  Department: [facility]  Room: [address] [place] [address]  Today's Date: 4/17/2025  Time Calculation  Start Time: 1141  Stop Time: 1208  Time Calculation (min): 27 min    Assessment  IP OT Assessment  OT Assessment: Pt demonstrates a decline in adl/functional mobility. Recommend   mod a intensity OT tx intervention x 3/week. Good prognosis for goal attainment  Barriers to Discharge Home: Physical needs, Caregiver assistance  End of Session Communication: Bedside nurse  End of Session Patient Position: Up in chair, Alarm off, not on at start of session  Plan:  Treatment Interventions: ADL retraining, Functional transfer training  OT Frequency: 3 times per week  OT Discharge Recommendations: Moderate intensity level of continued care  OT - OK to Discharge: Yes (once mediclally cleared)    Subjective   Current Problem:  1. ALFRED (acute kidney injury)        2. Diarrhea, unspecified type          General:  General  Reason for Referral: OT eval/tx/ impaired functional daily living skills  Referred By: Dr. Potts  Caregiver Feedback: Per conference with RN jack dos santos for  OT eval  Co-Treatment: PT  Co-Treatment Reason: maximize pt safety  Prior to Session Communication: Bedside nurse  Patient Position Received: Bed, 2 rail up  General Comment: Pt agreeable to OT tx intervention  Precautions:  Precautions Comment: fall, telemetry, ileostomy bag, [place], contact-retravirus, corrective lenses     Date/Time Vitals Session Patient Position Pulse Resp SpO2 BP MAP (mmHg)    04/17/25 19:42:53 --  --  69  16  97 %  100/57  73                Pain:  Pain Assessment  Pain Assessment: 0-10  0-10 (Numeric) Pain Score:  (lt patella pain w/ wbing rated 5-6/10 , no pain at rest, licocaine patch)    Objective   Cognition:  Overall Cognitive Status: Within Functional Limits           Home Living:  Type of Home: House  Lives With: Alone  Home Living Comments: 2 entry steps without rail, ,  bed/bath 1st floor, tub 1st floor not used , walk in shower in basement, standard toliet (1st floor and basement),Pt owns a stright cne nd wheeled wlker (not used), standard bed without rail. Pt reports indep with adl/home mgnt and driving. Pt generally reports not having increased supports at home (potentialy neighbors). Pt reports 1 fall at home  during the middle of the night withot injury x 2 months ago   Prior Function:  Prior Function Comments: Pt does not report owning any ADL equip  IADL History:   Pt indep  ADL:   Pta indpe without adl equip  Activity Tolerance:   Fair minus  Bed Mobility/Transfers: Bed Mobility  Bed Mobility:  (sba supine>sit hob elevated, effortful)    Transfers  Transfer:  (mod assits x2 sit>stand elevated bed >ww, difficulty w/ forward wt shift & le extension, cues for widened base of support , retropulsive upon standing; mod assist x1 stand>sit cues for safe hand plcmt & eccentric control)      Functional Mobility:  Functional Mobility  Functional Mobility Performed:  (MOD A X 2 WTH WHEELED WLAKER, RETROPULSIVE , INCREASE CUE SFOR POSTURE -BED TO CHAIR)  Modalities:     IADL's:      Vision: Vision - Basic Assessment  Current Vision: No visual deficits  Sensation:  Light Touch: No apparent deficits  Strength:  Strength Comments: strength below elbow wfl  Perception:   Wf   Coordination:    wfl  Hand Function:  Hand Function  Gross Grasp: Functional  Coordination: Functional  Extremities: RUE   RUE : Within Functional Limits and LUE   LUE: Within Functional Limits    Outcome Measures: [facility] Daily Activity  Putting on and taking off regular lower body clothing: A lot  Bathing (including washing, rinsing, drying): A lot  Putting on and taking off regular upper body clothing: A lot  Toileting, which includes using toilet, bedpan or urinal: A lot  Taking care of personal grooming such as brushing teeth: A lot (sink level)  Eating Meals: None  Daily Activity - Total Score: 14      Education  Documentation  No documentation found.  Education Comments  No comments found.      Goals:   Encounter Problems       Encounter Problems (Active)       impaired functional daily living skills       Pt will increase Grooming to s/mod indep   Upper Body Bathing to s/mod indep    Lower Body Bathing  to s/mod indep    Increase Upper Body Dressing  to s/mod indep     LE Dressing to s/mod indep with/ without adaptive equipment as needed    (Progressing)   Start:  04/17/25    Expected End:  05/01/25            Pt will increase Functional Transfers Bed Mobility to s/mod indep    Sit to Stand  to s/modindep    Functional Mobility  with  a device to s/mod indep  chair/toliet/room to increase indep/safety in patients discharge environment    (Progressing)   Start:  04/17/25    Expected End:  05/01/25            Pt will increase  energy conservation /work simplification/diaphragmatic breathing performance to s/sba assistance to increase independence and safety  within  the patient's discharge environment     (Progressing)   Start:  04/17/25    Expected End:  05/01/25

## 2025-04-17 NOTE — PROGRESS NOTES
Physical Therapy    Physical Therapy Evaluation    Patient Name: Antoine Kimball  MRN: [id number]  Today's Date: 4/17/2025   Time Calculation  Start Time: 1141  Stop Time: 1204  Time Calculation (min): 23 min  844/844-A    Assessment/Plan   PT Assessment  PT Assessment Results: Decreased strength, Decreased endurance, Impaired balance, Decreased mobility, Decreased safety awareness, Impaired hearing, Pain  Rehab Prognosis: Good  Barriers to Discharge Home: Caregiver assistance, Physical needs  Caregiver Assistance: Patient lives alone and/or does not have reliable caregiver assistance  Physical Needs: Ambulating household distances limited by function/safety, 24hr mobility assistance needed  Evaluation/Treatment Tolerance: Patient limited by fatigue (sob)  End of Session Communication: Bedside nurse, PCT/NA/CTA  Assessment Comment: Continued skilled PT intervention indicated to facilitate increased strength, balance & gait stability  End of Session Patient Position: Up in chair, Alarm off, not on at start of session (call light in reach)  IP OR SWING BED PT PLAN  Inpatient or Swing Bed: Inpatient  PT Plan  Treatment/Interventions: Bed mobility, Transfer training, Gait training, Balance training, Therapeutic exercise, Therapeutic activity  PT Plan: Ongoing PT  PT Frequency: 3 times per week  PT Discharge Recommendations: Moderate intensity level of continued care  PT Recommended Transfer Status: Assist x2  PT - OK to Discharge: Yes (to next level of care when cleared by medical team)    Subjective     Current Problem:  1. ALFRED (acute kidney injury)        2. Diarrhea, unspecified type          Problem List[1]    General Visit Information:  General  Reason for Referral: PT eval & treat/impaired mobility; DX: alfred on ckd, hypovolemia; rotavirus 4/12/25 increased output from ileostomy bag for last couple days; ctap: large calculus urinary bladder, (-) bowel obstruction  Caregiver Feedback: Per conference w/ RN patient stable  to participate in therapy  General Comment: Pleasant & cooperative, receptive to mobility& instructions, decreased insight regarding functional mobility deficits/high fall risk    Home Living:  Home Living  Home Living Comments: lives alone, does not identify significant support system; 2steps w/o rail to enter ranch style house w/ basement level laundry room, walk in shower w/ chair, grab bar, standard toilet w/o rail; 1st fl tub shoewr, standard toilet w/o rail; standard bed    Prior Level of Function:  Prior Function Per Pt/Caregiver Report  Prior Function Comments: independent mobility w/o use of device, own spc & ww, h/o 1 fall 2months ago, dizzy when getting up in the middle of the night; independent adl/iald/driving/yardwork    Precautions:  Precautions  Precautions Comment: fall, telemetry, ileostomy bag, [place], contact-retravirus, corrective lenses    Vital Signs:  Vital Signs  SpO2:  (93%)  Objective     Pain:  Pain Assessment  Pain Assessment:  (lt patella pain w/ wbing rated 5-6/10 , no pain at rest, licocaine patch)    Cognition:  Cognition  Overall Cognitive Status: Within Functional Limits    General Assessments:         Sensation  Light Touch: No apparent deficits   Postural Control  Posture Comment: kyphotic  Static Sitting Balance  Static Sitting-Balance Support:  (min/mod assist x1 w/ ww support, retropulsive)   Functional Assessments:     Bed Mobility  Bed Mobility:  (sba supine>sit hob elevated, effortful)  Transfers  Transfer:  (mod assits x2 sit>stand elevated bed >ww, difficulty w/ forward wt shift & le extension, cues for widened base of support , retropulsive upon standing; mod assist x1 stand>sit cues for safe hand plcmt & eccentric control)  Ambulation/Gait Training  Ambulation/Gait Training Performed:  (mod assist x2 w/ ww & gait belt placed at axillary level, 3ft pivot steps bed to chair, short shuffling steps, sob, retropulsive)     Extremity/Trunk Assessments:        RLE   RLE :  (end  rom tightness heelcord & hamstrings but arom grossly wfl; strength: hip flexion 3+/5 distally grossly 4/5)  LLE   LLE :  (end rom tightness heelcord & hamstrings but arom grossly wfl; strength: hip flexion 3+/5 distally grossly 4/5)    Outcome Measures:     [facility] Basic Mobility  Turning from your back to your side while in a flat bed without using bedrails: A little  Moving from lying on your back to sitting on the side of a flat bed without using bedrails: A lot  Moving to and from bed to chair (including a wheelchair): A lot  Standing up from a chair using your arms (e.g. wheelchair or bedside chair): A lot  To walk in hospital room: Total  Climbing 3-5 steps with railing: Total  Basic Mobility - Total Score: 11   Goals:  Encounter Problems       Encounter Problems (Active)       PT Problem       STG - Pt will transition supine <> sitting independently (Progressing)       Start:  04/17/25    Expected End:  05/01/25            STG - Pt will transfer STS with sba  (Progressing)       Start:  04/17/25    Expected End:  05/01/25            STG - Pt will amb >=40' x2 using ww with sba  (Progressing)       Start:  04/17/25    Expected End:  05/01/25            STG - Pt will perform 2-3 sets of BLE therex x10 to maximize functional strength and independence  (Progressing)       Start:  04/17/25    Expected End:  05/01/25            STG - Pt will maintain standing supported dynamic balance with no LOB noted   (Progressing)       Start:  04/17/25    Expected End:  05/01/25                 Education Documentation  Mobility Training, taught by Fernanda Montano PT at 4/17/2025  1:04 PM.  Learner: Patient  Readiness: Acceptance  Method: Explanation  Response: Needs Reinforcement  Comment: safety, activity progression, use of ww              [1]   Patient Active Problem List  Diagnosis   (none) - all problems resolved or deleted

## 2025-04-17 NOTE — PROGRESS NOTES
04/17/25 1342   Discharge Planning   Home or Post Acute Services Post acute facilities (Rehab/SNF/etc)   Type of Post Acute Facility Services Skilled nursing   Expected Discharge Disposition SNF   Does the patient need discharge transport arranged? Yes   RoundTrip coordination needed? Yes   Has discharge transport been arranged? No   Patient Choice   Provider Choice list and CMS website (https://medicare.gov/care-compare#search) for post-acute Quality and Resource Measure Data were provided and reviewed with: Patient     [facility] PT: 11 OT: 14  Met with patient at bedside to follow up on discharge plan. Discussed skilled nursing facility at discharge. Patient provided with SNF choice list. Patient provided preference of [facility]. Requested for DSC to send referral.

## 2025-04-17 NOTE — PROGRESS NOTES
Nephrology Consult Progress Note    Antoine Kimball is a 74 y.o. male on day 5 of admission presenting with ALFRED (acute kidney injury).      Subjective   No acute events overnight, improved stoma output, denies abd pain, new L knee pain       Objective          Physical Exam    Vitals 24HR  Heart Rate:  []   Temp:  [36.7 °C (98.1 °F)-37.6 °C (99.7 °F)]   Resp:  [18]   BP: (90-99)/(50-62)   SpO2:  [92 %-95 %]         AAOx3  Tachycardic, murmur+  CTA BL  Abd soft, + BS, ileostomy +  No edema           I&O 24HR    Intake/Output Summary (Last 24 hours) at 4/17/2025 1601  Last data filed at 4/17/2025 1244  Gross per 24 hour   Intake --   Output 2400 ml   Net -2400 ml         Medications  Medications Prior to Admission   Medication Sig Dispense Refill Last Dose/Taking    ARIPiprazole (Abilify) 2 mg tablet Take 1 tablet (2 mg) by mouth once daily.   4/12/2025 Morning    cyanocobalamin, vitamin B-12, 2,500 mcg tablet, sublingual SL tablet Place 1 tablet (2,500 mcg) under the tongue 2 times a week. Every Wednesday and Friday 4/11/2025    ergocalciferol (Vitamin D-2) 1250 mcg (50,000 units) capsule Take 1 capsule (1.25 mg) by mouth 2 times a week. Every Thursday and Dread   4/10/2025    mirtazapine (Remeron) 15 mg tablet Take 1 tablet (15 mg) by mouth once daily at bedtime.   4/11/2025 Evening    multivit-min/folic acid/lutein (CENTRUM SILVER ORAL) Take 1 tablet by mouth once daily.   4/12/2025 Morning    omeprazole (PriLOSEC) 20 mg DR capsule Take 1 capsule (20 mg) by mouth once daily at bedtime.   4/11/2025 Evening    PARoxetine (Paxil) 40 mg tablet Take 1 tablet (40 mg) by mouth once daily in the morning.   4/12/2025 Morning    tamsulosin (Flomax) 0.4 mg 24 hr capsule Take 1 capsule (0.4 mg) by mouth once daily in the morning.   4/12/2025 Morning      Scheduled medications  ARIPiprazole, 2 mg, oral, Daily  heparin (porcine), 5,000 Units, subcutaneous, q8h ERIN  lidocaine, 1 patch, transdermal, Daily  mirtazapine, 15  mg, oral, Nightly  pantoprazole, 40 mg, oral, Daily before breakfast  PARoxetine, 40 mg, oral, q AM  tamsulosin, 0.4 mg, oral, q AM      Continuous medications       PRN medications  PRN medications: acetaminophen **OR** acetaminophen **OR** acetaminophen, ondansetron **OR** ondansetron, traZODone    Relevant Results      Admission on 04/12/2025   Component Date Value Ref Range Status    WBC 04/12/2025 15.0 (H)  4.4 - 11.3 x10*3/uL Final    nRBC 04/12/2025 0.0  0.0 - 0.0 /100 WBCs Final    RBC 04/12/2025 5.35  4.50 - 5.90 x10*6/uL Final    Hemoglobin 04/12/2025 16.4  13.5 - 17.5 g/dL Final    Hematocrit 04/12/2025 52.1 (H)  41.0 - 52.0 % Final    MCV 04/12/2025 97  80 - 100 fL Final    MCH 04/12/2025 30.7  26.0 - 34.0 pg Final    MCHC 04/12/2025 31.5 (L)  32.0 - 36.0 g/dL Final    RDW 04/12/2025 13.3  11.5 - 14.5 % Final    Platelets 04/12/2025 296  150 - 450 x10*3/uL Final    Neutrophils % 04/12/2025 82.5  40.0 - 80.0 % Final    Immature Granulocytes %, Automated 04/12/2025 0.7  0.0 - 0.9 % Final    Immature Granulocyte Count (IG) includes promyelocytes, myelocytes and metamyelocytes but does not include bands. Percent differential counts (%) should be interpreted in the context of the absolute cell counts (cells/UL).    Lymphocytes % 04/12/2025 6.6  13.0 - 44.0 % Final    Monocytes % 04/12/2025 9.9  2.0 - 10.0 % Final    Eosinophils % 04/12/2025 0.1  0.0 - 6.0 % Final    Basophils % 04/12/2025 0.2  0.0 - 2.0 % Final    Neutrophils Absolute 04/12/2025 12.39 (H)  1.60 - 5.50 x10*3/uL Final    Percent differential counts (%) should be interpreted in the context of the absolute cell counts (cells/uL).    Immature Granulocytes Absolute, Au* 04/12/2025 0.11  0.00 - 0.50 x10*3/uL Final    Lymphocytes Absolute 04/12/2025 0.99  0.80 - 3.00 x10*3/uL Final    Monocytes Absolute 04/12/2025 1.49 (H)  0.05 - 0.80 x10*3/uL Final    Eosinophils Absolute 04/12/2025 0.01  0.00 - 0.40 x10*3/uL Final    Basophils Absolute 04/12/2025  0.03  0.00 - 0.10 x10*3/uL Final    Glucose 04/12/2025 139 (H)  74 - 99 mg/dL Final    Sodium 04/12/2025 135 (L)  136 - 145 mmol/L Final    Potassium 04/12/2025 4.5  3.5 - 5.3 mmol/L Final    MILD HEMOLYSIS DETECTED. The result may be falsely elevated due to hemolysis or other interferents. Clinical correlation is recommended. Repeat testing may be considered.    Chloride 04/12/2025 95 (L)  98 - 107 mmol/L Final    Bicarbonate 04/12/2025 13 (L)  21 - 32 mmol/L Final    Anion Gap 04/12/2025 32 (H)  10 - 20 mmol/L Final    Urea Nitrogen 04/12/2025 71 (H)  6 - 23 mg/dL Final    Creatinine 04/12/2025 5.88 (H)  0.50 - 1.30 mg/dL Final    eGFR 04/12/2025 9 (L)  >60 mL/min/1.73m*2 Final    Calculations of estimated GFR are performed using the 2021 CKD-EPI Study Refit equation without the race variable for the IDMS-Traceable creatinine methods.  https://jasn.asnjournals.org/content/early/2021/09/22/ASN.5409468926    Calcium 04/12/2025 10.5 (H)  8.6 - 10.3 mg/dL Final    Albumin 04/12/2025 5.4 (H)  3.4 - 5.0 g/dL Final    Alkaline Phosphatase 04/12/2025 125  33 - 136 U/L Final    Total Protein 04/12/2025 9.8 (H)  6.4 - 8.2 g/dL Final    AST 04/12/2025 67 (H)  9 - 39 U/L Final    MILD HEMOLYSIS DETECTED. The result may be falsely elevated due to hemolysis or other interferents. Clinical correlation is recommended. Repeat testing may be considered.    Bilirubin, Total 04/12/2025 0.8  0.0 - 1.2 mg/dL Final    ALT 04/12/2025 51  10 - 52 U/L Final    Patients treated with Sulfasalazine may generate falsely decreased results for ALT.    Lipase 04/12/2025 45  9 - 82 U/L Final    Magnesium 04/12/2025 2.18  1.60 - 2.40 mg/dL Final    Ventricular Rate 04/12/2025 90  BPM Final    Atrial Rate 04/12/2025 90  BPM Final    NE Interval 04/12/2025 162  ms Final    QRS Duration 04/12/2025 102  ms Final    QT Interval 04/12/2025 362  ms Final    QTC Calculation(Bazett) 04/12/2025 442  ms Final    P Axis 04/12/2025 77  degrees Final    R Axis  04/12/2025 -34  degrees Final    T Axis 04/12/2025 33  degrees Final    QRS Count 04/12/2025 15  beats Final    Q Onset 04/12/2025 209  ms Final    P Onset 04/12/2025 128  ms Final    P Offset 04/12/2025 182  ms Final    T Offset 04/12/2025 390  ms Final    QTC Fredericia 04/12/2025 414  ms Final    Extra Tube 04/12/2025 Hold for add-ons.   Final    Auto resulted.    POCT pH, Venous 04/12/2025 7.37  7.33 - 7.43 pH Final    POCT pCO2, Venous 04/12/2025 23 (L)  41 - 51 mm Hg Final    POCT pO2, Venous 04/12/2025 69 (H)  35 - 45 mm Hg Final    POCT SO2, Venous 04/12/2025 96 (H)  45 - 75 % Final    POCT Oxy Hemoglobin, Venous 04/12/2025 91.9 (H)  45.0 - 75.0 % Final    POCT Hematocrit Calculated, Venous 04/12/2025 47.0  41.0 - 52.0 % Final    POCT Sodium, Venous 04/12/2025 127 (L)  136 - 145 mmol/L Final    POCT Potassium, Venous 04/12/2025 8.1 (HH)  3.5 - 5.3 mmol/L Final    POCT Chloride, Venous 04/12/2025 103  98 - 107 mmol/L Final    POCT Ionized Calicum, Venous 04/12/2025 0.96 (L)  1.10 - 1.33 mmol/L Final    POCT Glucose, Venous 04/12/2025 114 (H)  74 - 99 mg/dL Final    POCT Lactate, Venous 04/12/2025 2.2 (H)  0.4 - 2.0 mmol/L Final    POCT Base Excess, Venous 04/12/2025 -9.7 (L)  -2.0 - 3.0 mmol/L Final    POCT HCO3 Calculated, Venous 04/12/2025 13.3 (L)  22.0 - 26.0 mmol/L Final    POCT Hemoglobin, Venous 04/12/2025 15.8  13.5 - 17.5 g/dL Final    POCT Anion Gap, Venous 04/12/2025 19.0  10.0 - 25.0 mmol/L Final    Patient Temperature 04/12/2025 37.0  degrees Celsius Final    FiO2 04/12/2025 21  % Final    Critical Called By 04/12/2025 REYNA FRANK RRT   Final    Critical Called To 04/12/2025 DR DELA CRUZ   Final    Critical Call Time 04/12/2025 2006   Final    Critical Read Back 04/12/2025 Y   Final    WBC 04/13/2025 10.9  4.4 - 11.3 x10*3/uL Final    nRBC 04/13/2025 0.0  0.0 - 0.0 /100 WBCs Final    RBC 04/13/2025 4.68  4.50 - 5.90 x10*6/uL Final    Hemoglobin 04/13/2025 14.1  13.5 - 17.5 g/dL Final    Hematocrit  04/13/2025 43.2  41.0 - 52.0 % Final    MCV 04/13/2025 92  80 - 100 fL Final    MCH 04/13/2025 30.1  26.0 - 34.0 pg Final    MCHC 04/13/2025 32.6  32.0 - 36.0 g/dL Final    RDW 04/13/2025 13.2  11.5 - 14.5 % Final    Platelets 04/13/2025 304  150 - 450 x10*3/uL Final    Glucose 04/13/2025 87  74 - 99 mg/dL Final    Sodium 04/13/2025 137  136 - 145 mmol/L Final    Potassium 04/13/2025 4.2  3.5 - 5.3 mmol/L Final    Chloride 04/13/2025 99  98 - 107 mmol/L Final    Bicarbonate 04/13/2025 18 (L)  21 - 32 mmol/L Final    Anion Gap 04/13/2025 24 (H)  10 - 20 mmol/L Final    Urea Nitrogen 04/13/2025 83 (H)  6 - 23 mg/dL Final    Creatinine 04/13/2025 4.95 (H)  0.50 - 1.30 mg/dL Final    eGFR 04/13/2025 12 (L)  >60 mL/min/1.73m*2 Final    Calculations of estimated GFR are performed using the 2021 CKD-EPI Study Refit equation without the race variable for the IDMS-Traceable creatinine methods.  https://jasn.asnjournals.org/content/early/2021/09/22/ASN.9190828063    Calcium 04/13/2025 9.3  8.6 - 10.3 mg/dL Final    Sodium, Urine Random 04/13/2025 12  mmol/L Final    Creatinine, Urine Random 04/13/2025 259.5  20.0 - 370.0 mg/dL Final    Sodium/Creatinine Ratio 04/13/2025 5  Not established. mmol/g Creat Final    C-Reactive Protein 04/13/2025 2.21 (H)  <1.00 mg/dL Final    Sedimentation Rate 04/13/2025 88 (H)  0 - 20 mm/h Final    Campylobacter Group 04/13/2025 Not Detected  Not Detected Final    Salmonella species 04/13/2025 Not Detected  Not Detected Final    Shigella species 04/13/2025 Not Detected  Not Detected Final    Vibrio Group 04/13/2025 Not Detected  Not Detected Final    Yersinia Enterocolitica 04/13/2025 Not Detected  Not Detected Final    Shiga Toxin 1 04/13/2025 Not Detected  Not Detected Final    Shiga Toxin 2 04/13/2025 Not Detected  Not Detected Final    Norovirus GI/GII 04/13/2025 Not Detected  Not Detected Final    Rotavirus A 04/13/2025 Detected (A)  Not Detected Final    C. difficile, PCR 04/13/2025 Not  Detected  Not Detected Final    WBC 04/14/2025 8.1  4.4 - 11.3 x10*3/uL Final    nRBC 04/14/2025 0.0  0.0 - 0.0 /100 WBCs Final    RBC 04/14/2025 4.40 (L)  4.50 - 5.90 x10*6/uL Final    Hemoglobin 04/14/2025 13.3 (L)  13.5 - 17.5 g/dL Final    Hematocrit 04/14/2025 41.3  41.0 - 52.0 % Final    MCV 04/14/2025 94  80 - 100 fL Final    MCH 04/14/2025 30.2  26.0 - 34.0 pg Final    MCHC 04/14/2025 32.2  32.0 - 36.0 g/dL Final    RDW 04/14/2025 13.3  11.5 - 14.5 % Final    Platelets 04/14/2025 254  150 - 450 x10*3/uL Final    Glucose 04/14/2025 97  74 - 99 mg/dL Final    Sodium 04/14/2025 138  136 - 145 mmol/L Final    Potassium 04/14/2025 3.5  3.5 - 5.3 mmol/L Final    Chloride 04/14/2025 104  98 - 107 mmol/L Final    Bicarbonate 04/14/2025 22  21 - 32 mmol/L Final    Anion Gap 04/14/2025 16  10 - 20 mmol/L Final    Urea Nitrogen 04/14/2025 80 (H)  6 - 23 mg/dL Final    Creatinine 04/14/2025 2.87 (H)  0.50 - 1.30 mg/dL Final    eGFR 04/14/2025 22 (L)  >60 mL/min/1.73m*2 Final    Calculations of estimated GFR are performed using the 2021 CKD-EPI Study Refit equation without the race variable for the IDMS-Traceable creatinine methods.  https://jasn.asnjournals.org/content/early/2021/09/22/ASN.7459993212    Calcium 04/14/2025 8.9  8.6 - 10.3 mg/dL Final    Glucose 04/15/2025 172 (H)  74 - 99 mg/dL Final    Sodium 04/15/2025 140  136 - 145 mmol/L Final    Potassium 04/15/2025 3.5  3.5 - 5.3 mmol/L Final    Chloride 04/15/2025 108 (H)  98 - 107 mmol/L Final    Bicarbonate 04/15/2025 23  21 - 32 mmol/L Final    Anion Gap 04/15/2025 13  10 - 20 mmol/L Final    Urea Nitrogen 04/15/2025 69 (H)  6 - 23 mg/dL Final    Creatinine 04/15/2025 1.83 (H)  0.50 - 1.30 mg/dL Final    eGFR 04/15/2025 38 (L)  >60 mL/min/1.73m*2 Final    Calculations of estimated GFR are performed using the 2021 CKD-EPI Study Refit equation without the race variable for the IDMS-Traceable creatinine  methods.  https://jasn.asnjournals.org/content/early/2021/09/22/ASN.5931572137    Calcium 04/15/2025 8.7  8.6 - 10.3 mg/dL Final    Phosphorus 04/15/2025 2.1 (L)  2.5 - 4.9 mg/dL Final    The performance characteristics of phosphorus testing in heparinized plasma have been validated by the individual  laboratory site where testing is performed. Testing on heparinized plasma is not approved by the FDA; however, such approval is not necessary.    Albumin 04/15/2025 3.8  3.4 - 5.0 g/dL Final    Extra Tube 04/15/2025 Hold for add-ons.   Final    Auto resulted.    Glucose 04/16/2025 93  74 - 99 mg/dL Final    Sodium 04/16/2025 139  136 - 145 mmol/L Final    Potassium 04/16/2025 3.9  3.5 - 5.3 mmol/L Final    Chloride 04/16/2025 109 (H)  98 - 107 mmol/L Final    Bicarbonate 04/16/2025 23  21 - 32 mmol/L Final    Anion Gap 04/16/2025 11  10 - 20 mmol/L Final    Urea Nitrogen 04/16/2025 45 (H)  6 - 23 mg/dL Final    Creatinine 04/16/2025 1.37 (H)  0.50 - 1.30 mg/dL Final    eGFR 04/16/2025 54 (L)  >60 mL/min/1.73m*2 Final    Calculations of estimated GFR are performed using the 2021 CKD-EPI Study Refit equation without the race variable for the IDMS-Traceable creatinine methods.  https://jasn.asnjournals.org/content/early/2021/09/22/ASN.8780154463    Calcium 04/16/2025 8.4 (L)  8.6 - 10.3 mg/dL Final    Phosphorus 04/16/2025 2.2 (L)  2.5 - 4.9 mg/dL Final    The performance characteristics of phosphorus testing in heparinized plasma have been validated by the individual  laboratory site where testing is performed. Testing on heparinized plasma is not approved by the FDA; however, such approval is not necessary.    Albumin 04/16/2025 3.6  3.4 - 5.0 g/dL Final    Extra Tube 04/16/2025 Hold for add-ons.   Final    Auto resulted.    Glucose 04/17/2025 88  74 - 99 mg/dL Final    Sodium 04/17/2025 139  136 - 145 mmol/L Final    Potassium 04/17/2025 4.0  3.5 - 5.3 mmol/L Final    Chloride 04/17/2025 110 (H)  98 - 107 mmol/L  Final    Bicarbonate 04/17/2025 21  21 - 32 mmol/L Final    Anion Gap 04/17/2025 12  10 - 20 mmol/L Final    Urea Nitrogen 04/17/2025 31 (H)  6 - 23 mg/dL Final    Creatinine 04/17/2025 1.19  0.50 - 1.30 mg/dL Final    eGFR 04/17/2025 64  >60 mL/min/1.73m*2 Final    Calculations of estimated GFR are performed using the 2021 CKD-EPI Study Refit equation without the race variable for the IDMS-Traceable creatinine methods.  https://jasn.asnjournals.org/content/early/2021/09/22/ASN.3876558025    Calcium 04/17/2025 8.5 (L)  8.6 - 10.3 mg/dL Final    Phosphorus 04/17/2025 2.3 (L)  2.5 - 4.9 mg/dL Final    The performance characteristics of phosphorus testing in heparinized plasma have been validated by the individual  laboratory site where testing is performed. Testing on heparinized plasma is not approved by the FDA; however, such approval is not necessary.    Albumin 04/17/2025 3.5  3.4 - 5.0 g/dL Final    Extra Tube 04/17/2025 Hold for add-ons.   Final    Auto resulted.      Imaging  CT abdomen pelvis wo IV contrast  Result Date: 4/12/2025  Cholelithiasis. Evaluation of the gallbladder is otherwise limited, and if the patient is experiencing right upper quadrant pain or there is otherwise concern for acute gallbladder pathology, ultrasound may be obtained for further evaluation.   Postsurgical change of prior bowel resection and right side ostomy. Large left inguinal hernia with herniation of multiple bowel loops. No evidence of bowel obstruction.   Large 4.4 cm calculus in the urinary bladder.   MACRO: None   Signed by: Parker Cooper 4/12/2025 6:26 PM Dictation workstation:   [id number]      Cardiology, Vascular, and Other Imaging  ECG 12 lead  Result Date: 4/16/2025  Normal sinus rhythm Left axis deviation Minimal voltage criteria for LVH, may be normal variant ( R in aVL ) Cannot rule out Anterior infarct , age undetermined Abnormal ECG No previous ECGs available See ED provider note for full interpretation and  clinical correlation Confirmed by Xi Strauss ([id number]) on 4/16/2025 6:06:20 PM          ASSESSMENT AND PLAN    74 y.o. male with PMH of Crohn disease status post ileostomy, BPH presenting with increased output from his ileostomy , found to have severe ALFRED     ALFRED on CKD, baseline creatinine of 1.3, was 5.88 on admission, resolved  Prerenal from high stoma output w/ s/o hemoconcentration on presentation     K stable  Mild HCa in setting of ALFRED  AGMA and NAGMA from gi losses     Hx of HTN here BP borderline low        Plan  - resolved ALFRED, K stable, resolved Hca  - noted high CRP, ESR, rotavirus+  - large bladder calculus, continue daily flomax  - R knee not inflamed, tylenol and lidocaine TD, check uric acid  - placement in progress, fup in 1-2 weeks with dr Garcia, will arrange            Thank you for the opportunity to assist in the care of this patient, please call with questions  Carley Garcia MD PhD

## 2025-04-18 ENCOUNTER — HOSPITAL ENCOUNTER (INPATIENT)
Dept: CARDIOLOGY | Facility: HOSPITAL | Age: 75
Discharge: HOME | DRG: 682 | End: 2025-04-18
Payer: MEDICARE

## 2025-04-18 DIAGNOSIS — N17.9 AKI (ACUTE KIDNEY INJURY): ICD-10-CM

## 2025-04-18 LAB
ANION GAP SERPL CALC-SCNC: 13 MMOL/L (ref 10–20)
BUN SERPL-MCNC: 29 MG/DL (ref 6–23)
CALCIUM SERPL-MCNC: 9.1 MG/DL (ref 8.6–10.3)
CHLORIDE SERPL-SCNC: 108 MMOL/L (ref 98–107)
CO2 SERPL-SCNC: 22 MMOL/L (ref 21–32)
CREAT SERPL-MCNC: 1.26 MG/DL (ref 0.5–1.3)
EGFRCR SERPLBLD CKD-EPI 2021: 60 ML/MIN/1.73M*2
GLUCOSE SERPL-MCNC: 160 MG/DL (ref 74–99)
HOLD SPECIMEN: NORMAL
MAGNESIUM SERPL-MCNC: 1.56 MG/DL (ref 1.6–2.4)
POTASSIUM SERPL-SCNC: 4 MMOL/L (ref 3.5–5.3)
SODIUM SERPL-SCNC: 139 MMOL/L (ref 136–145)

## 2025-04-18 PROCEDURE — 83735 ASSAY OF MAGNESIUM: CPT | Performed by: STUDENT IN AN ORGANIZED HEALTH CARE EDUCATION/TRAINING PROGRAM

## 2025-04-18 PROCEDURE — 2500000005 HC RX 250 GENERAL PHARMACY W/O HCPCS: Performed by: STUDENT IN AN ORGANIZED HEALTH CARE EDUCATION/TRAINING PROGRAM

## 2025-04-18 PROCEDURE — 2500000004 HC RX 250 GENERAL PHARMACY W/ HCPCS (ALT 636 FOR OP/ED): Mod: JZ | Performed by: STUDENT IN AN ORGANIZED HEALTH CARE EDUCATION/TRAINING PROGRAM

## 2025-04-18 PROCEDURE — 93005 ELECTROCARDIOGRAM TRACING: CPT

## 2025-04-18 PROCEDURE — 2500000005 HC RX 250 GENERAL PHARMACY W/O HCPCS: Performed by: INTERNAL MEDICINE

## 2025-04-18 PROCEDURE — 99232 SBSQ HOSP IP/OBS MODERATE 35: CPT | Performed by: STUDENT IN AN ORGANIZED HEALTH CARE EDUCATION/TRAINING PROGRAM

## 2025-04-18 PROCEDURE — 2500000002 HC RX 250 W HCPCS SELF ADMINISTERED DRUGS (ALT 637 FOR MEDICARE OP, ALT 636 FOR OP/ED): Performed by: FAMILY MEDICINE

## 2025-04-18 PROCEDURE — 2500000001 HC RX 250 WO HCPCS SELF ADMINISTERED DRUGS (ALT 637 FOR MEDICARE OP): Performed by: INTERNAL MEDICINE

## 2025-04-18 PROCEDURE — 36415 COLL VENOUS BLD VENIPUNCTURE: CPT | Performed by: STUDENT IN AN ORGANIZED HEALTH CARE EDUCATION/TRAINING PROGRAM

## 2025-04-18 PROCEDURE — 80048 BASIC METABOLIC PNL TOTAL CA: CPT | Performed by: STUDENT IN AN ORGANIZED HEALTH CARE EDUCATION/TRAINING PROGRAM

## 2025-04-18 PROCEDURE — 2500000004 HC RX 250 GENERAL PHARMACY W/ HCPCS (ALT 636 FOR OP/ED): Performed by: INTERNAL MEDICINE

## 2025-04-18 PROCEDURE — 2060000001 HC INTERMEDIATE ICU ROOM DAILY

## 2025-04-18 PROCEDURE — 2500000002 HC RX 250 W HCPCS SELF ADMINISTERED DRUGS (ALT 637 FOR MEDICARE OP, ALT 636 FOR OP/ED): Performed by: INTERNAL MEDICINE

## 2025-04-18 RX ORDER — MAGNESIUM SULFATE HEPTAHYDRATE 40 MG/ML
2 INJECTION, SOLUTION INTRAVENOUS ONCE
Status: COMPLETED | OUTPATIENT
Start: 2025-04-18 | End: 2025-04-18

## 2025-04-18 RX ADMIN — MIRTAZAPINE 15 MG: 15 TABLET, FILM COATED ORAL at 20:38

## 2025-04-18 RX ADMIN — HEPARIN SODIUM 5000 UNITS: 5000 INJECTION INTRAVENOUS; SUBCUTANEOUS at 22:37

## 2025-04-18 RX ADMIN — LIDOCAINE 1 PATCH: 4 PATCH TOPICAL at 09:01

## 2025-04-18 RX ADMIN — ARIPIPRAZOLE 2 MG: 2 TABLET ORAL at 09:00

## 2025-04-18 RX ADMIN — TRAZODONE HYDROCHLORIDE 50 MG: 50 TABLET ORAL at 22:37

## 2025-04-18 RX ADMIN — POTASSIUM PHOSPHATE, MONOBASIC POTASSIUM PHOSPHATE, DIBASIC 15 MMOL: 224; 236 INJECTION, SOLUTION, CONCENTRATE INTRAVENOUS at 10:07

## 2025-04-18 RX ADMIN — PANTOPRAZOLE SODIUM 40 MG: 40 TABLET, DELAYED RELEASE ORAL at 09:03

## 2025-04-18 RX ADMIN — PAROXETINE HYDROCHLORIDE 40 MG: 20 TABLET, FILM COATED ORAL at 09:00

## 2025-04-18 RX ADMIN — TAMSULOSIN HYDROCHLORIDE 0.4 MG: 0.4 CAPSULE ORAL at 09:00

## 2025-04-18 RX ADMIN — HEPARIN SODIUM 5000 UNITS: 5000 INJECTION INTRAVENOUS; SUBCUTANEOUS at 14:12

## 2025-04-18 RX ADMIN — HEPARIN SODIUM 5000 UNITS: 5000 INJECTION INTRAVENOUS; SUBCUTANEOUS at 05:08

## 2025-04-18 RX ADMIN — MAGNESIUM SULFATE HEPTAHYDRATE 2 G: 40 INJECTION, SOLUTION INTRAVENOUS at 14:12

## 2025-04-18 ASSESSMENT — PAIN SCALES - GENERAL: PAINLEVEL_OUTOF10: 0 - NO PAIN

## 2025-04-18 ASSESSMENT — COGNITIVE AND FUNCTIONAL STATUS - GENERAL
DRESSING REGULAR LOWER BODY CLOTHING: A LITTLE
DAILY ACTIVITIY SCORE: 18
TURNING FROM BACK TO SIDE WHILE IN FLAT BAD: A LITTLE
EATING MEALS: A LITTLE
CLIMB 3 TO 5 STEPS WITH RAILING: A LOT
HELP NEEDED FOR BATHING: A LITTLE
MOBILITY SCORE: 15
STANDING UP FROM CHAIR USING ARMS: A LOT
DRESSING REGULAR UPPER BODY CLOTHING: A LITTLE
WALKING IN HOSPITAL ROOM: A LOT
TOILETING: A LITTLE
MOVING TO AND FROM BED TO CHAIR: A LOT
PERSONAL GROOMING: A LITTLE

## 2025-04-18 NOTE — PROGRESS NOTES
Antoine Kimball is a 74 y.o. male on day 6 of admission presenting with ALFRED (acute kidney injury).      Subjective   Pt resting comfortably. Noted irregularities on tele; EKG obtained and is normal. Pt discharged to snf, approval pending. Naeo.       Objective     Last Recorded Vitals  /67 (BP Location: Right arm, Patient Position: Lying)   Pulse 65   Temp 36.5 °C (97.7 °F) (Temporal)   Resp 18   Wt 94.8 kg (209 lb)   SpO2 93%   Intake/Output last 3 Shifts:    Intake/Output Summary (Last 24 hours) at 4/18/2025 1016  Last data filed at 4/18/2025 0500  Gross per 24 hour   Intake --   Output 1800 ml   Net -1800 ml       Admission Weight  Weight: 68 kg (150 lb) (04/12/25 1558)    Daily Weight  04/12/25 : 94.8 kg (209 lb)    Image Results  ECG 12 lead  Normal sinus rhythm  Left axis deviation  Minimal voltage criteria for LVH, may be normal variant ( R in aVL )  Cannot rule out Anterior infarct , age undetermined  Abnormal ECG  No previous ECGs available  See ED provider note for full interpretation and clinical correlation  Confirmed by Xi Strauss ([id number]) on 4/16/2025 6:06:20 PM      Physical Exam  General Appearance: awake and alert, AAOx3, NAD  HEENT: nc/at, eomi, perrla, moist mucous membranes  Resp: ctab; no wheezing, rhonchi, or rales, normal respiratory effort  Cardio: rrr. S1s2  GI: soft, ntnd, BS+  Ext: no edema, 2+ pulses b/l      Relevant Results                              Assessment & Plan      Hypovolemia  Prerenal ALFRED w/metabolic acidosis  SIRS  H/o Crohns s/p ileostomy    Pt clinically improved and stable, labs normalized  Hemodynamically stable  Discharged to SNF, SW on board.         Marco A Patricia MD

## 2025-04-18 NOTE — PROGRESS NOTES
04/18/25 1223   Discharge Planning   Home or Post Acute Services Post acute facilities (Rehab/SNF/etc)   Type of Post Acute Facility Services Skilled nursing   Expected Discharge Disposition SNF     [facility]. Patient updated at bedside. Requested for direct pre-cert team to start prior authorization and requested for [facility] to start pre-cert.    Addendum 1517: Pre-cert is pending for [facility].

## 2025-04-18 NOTE — CONSULTS
"Nutrition Initial Assessment:   Nutrition Assessment         Patient is a 74 y.o. male presenting with increased ileostomy output      Nutrition History:  Food and Nutrient History: Pt continues to eat his meals well and likes his ensure       Anthropometrics:  Height: 180.3 cm (5' 11\")   Weight: 94.8 kg (209 lb)   BMI (Calculated): 29.16  IBW/kg (Dietitian Calculated): 81 kg  Percent of IBW: 117 %                      Weight History:   Wt Readings from Last 10 Encounters:   04/12/25 94.8 kg (209 lb)   08/31/20 82.1 kg (181 lb)         Weight Change %:       Nutrition Focused Physical Exam Findings:    Subcutaneous Fat Loss:   Defer Subcutaneous Fat Loss Assessment: Defer all  Defer All Reason: not a good time  Muscle Wasting:  Defer Muscle Wasting Assessment: Defer all  Defer All Reason: not a good time  Edema:  Edema Location: some edema  Physical Findings:  Skin: Positive (Lt foot and rt tibial)  Digestive System Findings: Diarrhea    Nutrition Significant Labs:  BMP Trend:   Results from last 7 days   Lab Units 04/18/25  0928 04/17/25  0542 04/16/25  0551 04/15/25  0913   GLUCOSE mg/dL 160* 88 93 172*   CALCIUM mg/dL 9.1 8.5* 8.4* 8.7   SODIUM mmol/L 139 139 139 140   POTASSIUM mmol/L 4.0 4.0 3.9 3.5   CO2 mmol/L 22 21 23 23   CHLORIDE mmol/L 108* 110* 109* 108*   BUN mg/dL 29* 31* 45* 69*   CREATININE mg/dL 1.26 1.19 1.37* 1.83*        Nutrition Specific Medications:  Remeron; protonix; zofran    I/O:   Last BM Date: 04/17/25; Stool Appearance: Soft, Loose (04/15/25 2100)    Dietary Orders (From admission, onward)       Start     Ordered    04/14/25 2205  Oral nutritional supplements  Until discontinued        Comments: Vanilla   Question Answer Comment   Deliver with Breakfast    Select supplement: Ensure Plus High Protein        04/14/25 2204 04/12/25 2339  May Participate in Room Service  ( ROOM SERVICE MAY PARTICIPATE)  Once        Question:  .  Answer:  Yes    04/12/25 2338 04/12/25 2034  Adult " diet Regular  Diet effective now        Question:  Diet type  Answer:  Regular    04/12/25 2033                     Estimated Needs:      Method for Estimating Needs: 0181-1418   26-30 al kg of IBW     Method for Estimating 24 Hour Protein Needs: 81-97  1-1.2 gm kg of IBW     Method for Estimating 24 Hour Fluid Needs: 6013-7893  20-30 ml kg of IBW as medically indicated  Patient on Order Fluid Restriction: No        Nutrition Diagnosis        Nutrition Diagnosis  Patient has Nutrition Diagnosis: Yes  Diagnosis Status (1): Active  Nutrition Diagnosis 1: Altered GI function  Related to (1): compromised exocrine function   and compromised structure and function of GI tract  As Evidenced by (1): abnormal renal panel  Additional Nutrition Diagnosis: Diagnosis 2       Nutrition Interventions/Recommendations        Nutrition Recommendations:  Individualized Nutrition Prescription Provided for : Continue regular diet,  sending Ensure plus high protein at breakfast to help meet nutritional needs    Nutrition Interventions/Goals:   Goal: >75% of meals  Medical Food Supplement: Commercial beverage medical food supplement therapy  Goal: 100% of supplement  Coordination of Care with Providers: Nursing, Provider      Education Documentation  No documentation found.     Not at this time       Nutrition Monitoring and Evaluation   Food/Nutrient Related History Monitoring  Monitoring and Evaluation Plan: Estimated Energy Intake, Fluid intake, Intake / amount of food  Estimated Energy Intake: Energy intake greater or equal to 75% of estimated energy needs  Intake / Amount of food: Consumes at least 75% or more of meals/snacks/supplements, Meets > 75% estimated energy needs    Anthropometric Measurements  Monitoring and Evaluation Plan: Body weight    Biochemical Data, Medical Tests and Procedures  Monitoring and Evaluation Plan: Electrolyte/renal panel, Glucose/endocrine profile  Criteria: improved BMP  Criteria: glucose within  desired range              Time Spent (min): 30 minutes

## 2025-04-18 NOTE — CONSULTS
"Consults    Reason For Consult  4.4cm bladder stone    History Of Present Illness  Antoine Kimball is a 74 y.o. male presenting with history of crohns/ileostomy  Admitted for increased ileostomy op  Found to have graciela  This has resolved  Consulted for bladder stone  He follows with [facility] in [place].  He saw them last January  Is on flomax  This bladder stone is known to him and being treated conservatively by his urologist  Pvr at [facility] was zero 1/13/25  Is on 1 flomax and voiding well  Ct 4/12/25 no hydronephrosis     Past Medical History  He has a past medical history of Personal history of other diseases of the digestive system (08/31/2020).    Surgical History  He has a past surgical history that includes Other surgical history (08/26/2020) and Other surgical history (08/31/2020).     Social History  He reports that he has never smoked. He has never used smokeless tobacco. He reports that he does not currently use alcohol. He reports that he does not use drugs.    Family History  Family History[1]     Allergies  Patient has no known allergies.    Review of Systems     Physical Exam     Last Recorded Vitals  Blood pressure 113/67, pulse 65, temperature 36.5 °C (97.7 °F), temperature source Temporal, resp. rate 18, height 1.803 m (5' 11\"), weight 94.8 kg (209 lb), SpO2 93%.    Relevant Results  ECG 12 lead  Result Date: 4/16/2025  Normal sinus rhythm Left axis deviation Minimal voltage criteria for LVH, may be normal variant ( R in aVL ) Cannot rule out Anterior infarct , age undetermined Abnormal ECG No previous ECGs available See ED provider note for full interpretation and clinical correlation Confirmed by Xi Strauss ([id number]) on 4/16/2025 6:06:20 PM    CT abdomen pelvis wo IV contrast  Result Date: 4/12/2025  Interpreted By:  Parker Cooper, STUDY: CT ABDOMEN PELVIS WO IV CONTRAST;  4/12/2025 5:45 pm   INDICATION: Signs/Symptoms:rlq pain, severe diarrhea.   COMPARISON: None.   ACCESSION " NUMBER(S): [id number]   ORDERING CLINICIAN: MONTY DELA CRUZ   TECHNIQUE: Contiguous axial images of the abdomen and pelvis were obtained without intravenous contrast. Coronal and sagittal reformatted images were obtained from the axial images.   FINDINGS: Mild basilar atelectasis No pleural effusion.   Evaluation of the abdominal viscera is limited secondary to lack of intravenous contrast. Limited evaluation for liver mass on noncontrast examination. Calcified granuloma in the liver. There is cholelithiasis. Evaluation the gallbladder is otherwise limited on the noncontrast examination. No significant dilatation of the bile duct.   The pancreas and spleen appear unremarkable.   Mild nodularity of the left adrenal gland. The right adrenal gland appears unremarkable.   Small nonobstructive left renal calculus. Subcentimeter hypodensities too small to characterize. No hydronephrosis. Evaluation of the kidneys is otherwise limited secondary lack of intravenous contrast.   10 mm peripherally calcified splenic artery aneurysm.   There is postsurgical change of prior bowel resection and right side ostomy. No evidence of bowel obstruction. There is large left inguinal hernia with herniation of bowel.   4.4 cm large calculus in the urinary bladder. Urinary bladder is underdistended and not well evaluated.   Postsurgical change with surgical clips in the midline abdominal and pelvic wall.   Multilevel degenerative change of the lumbar spine.       Cholelithiasis. Evaluation of the gallbladder is otherwise limited, and if the patient is experiencing right upper quadrant pain or there is otherwise concern for acute gallbladder pathology, ultrasound may be obtained for further evaluation.   Postsurgical change of prior bowel resection and right side ostomy. Large left inguinal hernia with herniation of multiple bowel loops. No evidence of bowel obstruction.   Large 4.4 cm calculus in the urinary bladder.   MACRO: None   Signed  by: Parker Cooper 4/12/2025 6:26 PM Dictation workstation:   [id number]     Results for orders placed or performed during the hospital encounter of 04/12/25 (from the past 24 hours)   Magnesium   Result Value Ref Range    Magnesium 1.56 (L) 1.60 - 2.40 mg/dL   Basic metabolic panel   Result Value Ref Range    Glucose 160 (H) 74 - 99 mg/dL    Sodium 139 136 - 145 mmol/L    Potassium 4.0 3.5 - 5.3 mmol/L    Chloride 108 (H) 98 - 107 mmol/L    Bicarbonate 22 21 - 32 mmol/L    Anion Gap 13 10 - 20 mmol/L    Urea Nitrogen 29 (H) 6 - 23 mg/dL    Creatinine 1.26 0.50 - 1.30 mg/dL    eGFR 60 (L) >60 mL/min/1.73m*2    Calcium 9.1 8.6 - 10.3 mg/dL          reviewed     Assessment/Plan     4.4 cm bladder stone  Being followed by his [facility] urologist dr chandler mason  No gu intervention  Will sign off  Please call with questions  Thank you    I spent 20 minutes in the professional and overall care of this patient.               [1] No family history on file.

## 2025-04-18 NOTE — PROGRESS NOTES
Nephrology Consult Progress Note    Antoine Kimball is a 74 y.o. male on day 6 of admission presenting with ALFRED (acute kidney injury).      Subjective   No acute events overnight, improved stoma output, denies abd pain, new L knee pain       Objective          Physical Exam    Vitals 24HR  Heart Rate:  [61-84]   Temp:  [36.2 °C (97.2 °F)-37.2 °C (99 °F)]   Resp:  [16-18]   BP: ()/(57-67)   SpO2:  [92 %-97 %]         AAOx3  Tachycardic, murmur+  CTA BL  Abd soft, + BS, ileostomy +  No edema           I&O 24HR    Intake/Output Summary (Last 24 hours) at 4/18/2025 1302  Last data filed at 4/18/2025 0500  Gross per 24 hour   Intake --   Output 1550 ml   Net -1550 ml         Medications  Medications Prior to Admission   Medication Sig Dispense Refill Last Dose/Taking    ARIPiprazole (Abilify) 2 mg tablet Take 1 tablet (2 mg) by mouth once daily.   4/12/2025 Morning    cyanocobalamin, vitamin B-12, 2,500 mcg tablet, sublingual SL tablet Place 1 tablet (2,500 mcg) under the tongue 2 times a week. Every Wednesday and Friday 4/11/2025    ergocalciferol (Vitamin D-2) 1250 mcg (50,000 units) capsule Take 1 capsule (1.25 mg) by mouth 2 times a week. Every Thursday and Dread   4/10/2025    mirtazapine (Remeron) 15 mg tablet Take 1 tablet (15 mg) by mouth once daily at bedtime.   4/11/2025 Evening    multivit-min/folic acid/lutein (CENTRUM SILVER ORAL) Take 1 tablet by mouth once daily.   4/12/2025 Morning    omeprazole (PriLOSEC) 20 mg DR capsule Take 1 capsule (20 mg) by mouth once daily at bedtime.   4/11/2025 Evening    PARoxetine (Paxil) 40 mg tablet Take 1 tablet (40 mg) by mouth once daily in the morning.   4/12/2025 Morning    tamsulosin (Flomax) 0.4 mg 24 hr capsule Take 1 capsule (0.4 mg) by mouth once daily in the morning.   4/12/2025 Morning      Scheduled medications  ARIPiprazole, 2 mg, oral, Daily  heparin (porcine), 5,000 Units, subcutaneous, q8h ERIN  lidocaine, 1 patch, transdermal, Daily  mirtazapine, 15  mg, oral, Nightly  pantoprazole, 40 mg, oral, Daily before breakfast  PARoxetine, 40 mg, oral, q AM  potassium phosphate, 15 mmol, intravenous, Once  tamsulosin, 0.4 mg, oral, q AM      Continuous medications       PRN medications  PRN medications: acetaminophen **OR** acetaminophen **OR** acetaminophen, ondansetron **OR** ondansetron, traZODone    Relevant Results      Admission on 04/12/2025   Component Date Value Ref Range Status    WBC 04/12/2025 15.0 (H)  4.4 - 11.3 x10*3/uL Final    nRBC 04/12/2025 0.0  0.0 - 0.0 /100 WBCs Final    RBC 04/12/2025 5.35  4.50 - 5.90 x10*6/uL Final    Hemoglobin 04/12/2025 16.4  13.5 - 17.5 g/dL Final    Hematocrit 04/12/2025 52.1 (H)  41.0 - 52.0 % Final    MCV 04/12/2025 97  80 - 100 fL Final    MCH 04/12/2025 30.7  26.0 - 34.0 pg Final    MCHC 04/12/2025 31.5 (L)  32.0 - 36.0 g/dL Final    RDW 04/12/2025 13.3  11.5 - 14.5 % Final    Platelets 04/12/2025 296  150 - 450 x10*3/uL Final    Neutrophils % 04/12/2025 82.5  40.0 - 80.0 % Final    Immature Granulocytes %, Automated 04/12/2025 0.7  0.0 - 0.9 % Final    Immature Granulocyte Count (IG) includes promyelocytes, myelocytes and metamyelocytes but does not include bands. Percent differential counts (%) should be interpreted in the context of the absolute cell counts (cells/UL).    Lymphocytes % 04/12/2025 6.6  13.0 - 44.0 % Final    Monocytes % 04/12/2025 9.9  2.0 - 10.0 % Final    Eosinophils % 04/12/2025 0.1  0.0 - 6.0 % Final    Basophils % 04/12/2025 0.2  0.0 - 2.0 % Final    Neutrophils Absolute 04/12/2025 12.39 (H)  1.60 - 5.50 x10*3/uL Final    Percent differential counts (%) should be interpreted in the context of the absolute cell counts (cells/uL).    Immature Granulocytes Absolute, Au* 04/12/2025 0.11  0.00 - 0.50 x10*3/uL Final    Lymphocytes Absolute 04/12/2025 0.99  0.80 - 3.00 x10*3/uL Final    Monocytes Absolute 04/12/2025 1.49 (H)  0.05 - 0.80 x10*3/uL Final    Eosinophils Absolute 04/12/2025 0.01  0.00 - 0.40  x10*3/uL Final    Basophils Absolute 04/12/2025 0.03  0.00 - 0.10 x10*3/uL Final    Glucose 04/12/2025 139 (H)  74 - 99 mg/dL Final    Sodium 04/12/2025 135 (L)  136 - 145 mmol/L Final    Potassium 04/12/2025 4.5  3.5 - 5.3 mmol/L Final    MILD HEMOLYSIS DETECTED. The result may be falsely elevated due to hemolysis or other interferents. Clinical correlation is recommended. Repeat testing may be considered.    Chloride 04/12/2025 95 (L)  98 - 107 mmol/L Final    Bicarbonate 04/12/2025 13 (L)  21 - 32 mmol/L Final    Anion Gap 04/12/2025 32 (H)  10 - 20 mmol/L Final    Urea Nitrogen 04/12/2025 71 (H)  6 - 23 mg/dL Final    Creatinine 04/12/2025 5.88 (H)  0.50 - 1.30 mg/dL Final    eGFR 04/12/2025 9 (L)  >60 mL/min/1.73m*2 Final    Calculations of estimated GFR are performed using the 2021 CKD-EPI Study Refit equation without the race variable for the IDMS-Traceable creatinine methods.  https://jasn.asnjournals.org/content/early/2021/09/22/ASN.6721608468    Calcium 04/12/2025 10.5 (H)  8.6 - 10.3 mg/dL Final    Albumin 04/12/2025 5.4 (H)  3.4 - 5.0 g/dL Final    Alkaline Phosphatase 04/12/2025 125  33 - 136 U/L Final    Total Protein 04/12/2025 9.8 (H)  6.4 - 8.2 g/dL Final    AST 04/12/2025 67 (H)  9 - 39 U/L Final    MILD HEMOLYSIS DETECTED. The result may be falsely elevated due to hemolysis or other interferents. Clinical correlation is recommended. Repeat testing may be considered.    Bilirubin, Total 04/12/2025 0.8  0.0 - 1.2 mg/dL Final    ALT 04/12/2025 51  10 - 52 U/L Final    Patients treated with Sulfasalazine may generate falsely decreased results for ALT.    Lipase 04/12/2025 45  9 - 82 U/L Final    Magnesium 04/12/2025 2.18  1.60 - 2.40 mg/dL Final    Ventricular Rate 04/12/2025 90  BPM Final    Atrial Rate 04/12/2025 90  BPM Final    KS Interval 04/12/2025 162  ms Final    QRS Duration 04/12/2025 102  ms Final    QT Interval 04/12/2025 362  ms Final    QTC Calculation(Bazett) 04/12/2025 442  ms Final     P Axis 04/12/2025 77  degrees Final    R Axis 04/12/2025 -34  degrees Final    T Axis 04/12/2025 33  degrees Final    QRS Count 04/12/2025 15  beats Final    Q Onset 04/12/2025 209  ms Final    P Onset 04/12/2025 128  ms Final    P Offset 04/12/2025 182  ms Final    T Offset 04/12/2025 390  ms Final    QTC Fredericia 04/12/2025 414  ms Final    Extra Tube 04/12/2025 Hold for add-ons.   Final    Auto resulted.    POCT pH, Venous 04/12/2025 7.37  7.33 - 7.43 pH Final    POCT pCO2, Venous 04/12/2025 23 (L)  41 - 51 mm Hg Final    POCT pO2, Venous 04/12/2025 69 (H)  35 - 45 mm Hg Final    POCT SO2, Venous 04/12/2025 96 (H)  45 - 75 % Final    POCT Oxy Hemoglobin, Venous 04/12/2025 91.9 (H)  45.0 - 75.0 % Final    POCT Hematocrit Calculated, Venous 04/12/2025 47.0  41.0 - 52.0 % Final    POCT Sodium, Venous 04/12/2025 127 (L)  136 - 145 mmol/L Final    POCT Potassium, Venous 04/12/2025 8.1 (HH)  3.5 - 5.3 mmol/L Final    POCT Chloride, Venous 04/12/2025 103  98 - 107 mmol/L Final    POCT Ionized Calicum, Venous 04/12/2025 0.96 (L)  1.10 - 1.33 mmol/L Final    POCT Glucose, Venous 04/12/2025 114 (H)  74 - 99 mg/dL Final    POCT Lactate, Venous 04/12/2025 2.2 (H)  0.4 - 2.0 mmol/L Final    POCT Base Excess, Venous 04/12/2025 -9.7 (L)  -2.0 - 3.0 mmol/L Final    POCT HCO3 Calculated, Venous 04/12/2025 13.3 (L)  22.0 - 26.0 mmol/L Final    POCT Hemoglobin, Venous 04/12/2025 15.8  13.5 - 17.5 g/dL Final    POCT Anion Gap, Venous 04/12/2025 19.0  10.0 - 25.0 mmol/L Final    Patient Temperature 04/12/2025 37.0  degrees Celsius Final    FiO2 04/12/2025 21  % Final    Critical Called By 04/12/2025 REYNA FRANK RRT   Final    Critical Called To 04/12/2025 DR DELA CRUZ   Final    Critical Call Time 04/12/2025 2006   Final    Critical Read Back 04/12/2025 Y   Final    WBC 04/13/2025 10.9  4.4 - 11.3 x10*3/uL Final    nRBC 04/13/2025 0.0  0.0 - 0.0 /100 WBCs Final    RBC 04/13/2025 4.68  4.50 - 5.90 x10*6/uL Final    Hemoglobin 04/13/2025  14.1  13.5 - 17.5 g/dL Final    Hematocrit 04/13/2025 43.2  41.0 - 52.0 % Final    MCV 04/13/2025 92  80 - 100 fL Final    MCH 04/13/2025 30.1  26.0 - 34.0 pg Final    MCHC 04/13/2025 32.6  32.0 - 36.0 g/dL Final    RDW 04/13/2025 13.2  11.5 - 14.5 % Final    Platelets 04/13/2025 304  150 - 450 x10*3/uL Final    Glucose 04/13/2025 87  74 - 99 mg/dL Final    Sodium 04/13/2025 137  136 - 145 mmol/L Final    Potassium 04/13/2025 4.2  3.5 - 5.3 mmol/L Final    Chloride 04/13/2025 99  98 - 107 mmol/L Final    Bicarbonate 04/13/2025 18 (L)  21 - 32 mmol/L Final    Anion Gap 04/13/2025 24 (H)  10 - 20 mmol/L Final    Urea Nitrogen 04/13/2025 83 (H)  6 - 23 mg/dL Final    Creatinine 04/13/2025 4.95 (H)  0.50 - 1.30 mg/dL Final    eGFR 04/13/2025 12 (L)  >60 mL/min/1.73m*2 Final    Calculations of estimated GFR are performed using the 2021 CKD-EPI Study Refit equation without the race variable for the IDMS-Traceable creatinine methods.  https://jasn.asnjournals.org/content/early/2021/09/22/ASN.4923160382    Calcium 04/13/2025 9.3  8.6 - 10.3 mg/dL Final    Sodium, Urine Random 04/13/2025 12  mmol/L Final    Creatinine, Urine Random 04/13/2025 259.5  20.0 - 370.0 mg/dL Final    Sodium/Creatinine Ratio 04/13/2025 5  Not established. mmol/g Creat Final    C-Reactive Protein 04/13/2025 2.21 (H)  <1.00 mg/dL Final    Sedimentation Rate 04/13/2025 88 (H)  0 - 20 mm/h Final    Campylobacter Group 04/13/2025 Not Detected  Not Detected Final    Salmonella species 04/13/2025 Not Detected  Not Detected Final    Shigella species 04/13/2025 Not Detected  Not Detected Final    Vibrio Group 04/13/2025 Not Detected  Not Detected Final    Yersinia Enterocolitica 04/13/2025 Not Detected  Not Detected Final    Shiga Toxin 1 04/13/2025 Not Detected  Not Detected Final    Shiga Toxin 2 04/13/2025 Not Detected  Not Detected Final    Norovirus GI/GII 04/13/2025 Not Detected  Not Detected Final    Rotavirus A 04/13/2025 Detected (A)  Not Detected  Final    C. difficile, PCR 04/13/2025 Not Detected  Not Detected Final    WBC 04/14/2025 8.1  4.4 - 11.3 x10*3/uL Final    nRBC 04/14/2025 0.0  0.0 - 0.0 /100 WBCs Final    RBC 04/14/2025 4.40 (L)  4.50 - 5.90 x10*6/uL Final    Hemoglobin 04/14/2025 13.3 (L)  13.5 - 17.5 g/dL Final    Hematocrit 04/14/2025 41.3  41.0 - 52.0 % Final    MCV 04/14/2025 94  80 - 100 fL Final    MCH 04/14/2025 30.2  26.0 - 34.0 pg Final    MCHC 04/14/2025 32.2  32.0 - 36.0 g/dL Final    RDW 04/14/2025 13.3  11.5 - 14.5 % Final    Platelets 04/14/2025 254  150 - 450 x10*3/uL Final    Glucose 04/14/2025 97  74 - 99 mg/dL Final    Sodium 04/14/2025 138  136 - 145 mmol/L Final    Potassium 04/14/2025 3.5  3.5 - 5.3 mmol/L Final    Chloride 04/14/2025 104  98 - 107 mmol/L Final    Bicarbonate 04/14/2025 22  21 - 32 mmol/L Final    Anion Gap 04/14/2025 16  10 - 20 mmol/L Final    Urea Nitrogen 04/14/2025 80 (H)  6 - 23 mg/dL Final    Creatinine 04/14/2025 2.87 (H)  0.50 - 1.30 mg/dL Final    eGFR 04/14/2025 22 (L)  >60 mL/min/1.73m*2 Final    Calculations of estimated GFR are performed using the 2021 CKD-EPI Study Refit equation without the race variable for the IDMS-Traceable creatinine methods.  https://jasn.asnjournals.org/content/early/2021/09/22/ASN.2635223181    Calcium 04/14/2025 8.9  8.6 - 10.3 mg/dL Final    Glucose 04/15/2025 172 (H)  74 - 99 mg/dL Final    Sodium 04/15/2025 140  136 - 145 mmol/L Final    Potassium 04/15/2025 3.5  3.5 - 5.3 mmol/L Final    Chloride 04/15/2025 108 (H)  98 - 107 mmol/L Final    Bicarbonate 04/15/2025 23  21 - 32 mmol/L Final    Anion Gap 04/15/2025 13  10 - 20 mmol/L Final    Urea Nitrogen 04/15/2025 69 (H)  6 - 23 mg/dL Final    Creatinine 04/15/2025 1.83 (H)  0.50 - 1.30 mg/dL Final    eGFR 04/15/2025 38 (L)  >60 mL/min/1.73m*2 Final    Calculations of estimated GFR are performed using the 2021 CKD-EPI Study Refit equation without the race variable for the IDMS-Traceable creatinine  methods.  https://jasn.asnjournals.org/content/early/2021/09/22/ASN.3851675412    Calcium 04/15/2025 8.7  8.6 - 10.3 mg/dL Final    Phosphorus 04/15/2025 2.1 (L)  2.5 - 4.9 mg/dL Final    The performance characteristics of phosphorus testing in heparinized plasma have been validated by the individual  laboratory site where testing is performed. Testing on heparinized plasma is not approved by the FDA; however, such approval is not necessary.    Albumin 04/15/2025 3.8  3.4 - 5.0 g/dL Final    Extra Tube 04/15/2025 Hold for add-ons.   Final    Auto resulted.    Glucose 04/16/2025 93  74 - 99 mg/dL Final    Sodium 04/16/2025 139  136 - 145 mmol/L Final    Potassium 04/16/2025 3.9  3.5 - 5.3 mmol/L Final    Chloride 04/16/2025 109 (H)  98 - 107 mmol/L Final    Bicarbonate 04/16/2025 23  21 - 32 mmol/L Final    Anion Gap 04/16/2025 11  10 - 20 mmol/L Final    Urea Nitrogen 04/16/2025 45 (H)  6 - 23 mg/dL Final    Creatinine 04/16/2025 1.37 (H)  0.50 - 1.30 mg/dL Final    eGFR 04/16/2025 54 (L)  >60 mL/min/1.73m*2 Final    Calculations of estimated GFR are performed using the 2021 CKD-EPI Study Refit equation without the race variable for the IDMS-Traceable creatinine methods.  https://jasn.asnjournals.org/content/early/2021/09/22/ASN.6635621737    Calcium 04/16/2025 8.4 (L)  8.6 - 10.3 mg/dL Final    Phosphorus 04/16/2025 2.2 (L)  2.5 - 4.9 mg/dL Final    The performance characteristics of phosphorus testing in heparinized plasma have been validated by the individual  laboratory site where testing is performed. Testing on heparinized plasma is not approved by the FDA; however, such approval is not necessary.    Albumin 04/16/2025 3.6  3.4 - 5.0 g/dL Final    Extra Tube 04/16/2025 Hold for add-ons.   Final    Auto resulted.    Glucose 04/17/2025 88  74 - 99 mg/dL Final    Sodium 04/17/2025 139  136 - 145 mmol/L Final    Potassium 04/17/2025 4.0  3.5 - 5.3 mmol/L Final    Chloride 04/17/2025 110 (H)  98 - 107 mmol/L  Final    Bicarbonate 04/17/2025 21  21 - 32 mmol/L Final    Anion Gap 04/17/2025 12  10 - 20 mmol/L Final    Urea Nitrogen 04/17/2025 31 (H)  6 - 23 mg/dL Final    Creatinine 04/17/2025 1.19  0.50 - 1.30 mg/dL Final    eGFR 04/17/2025 64  >60 mL/min/1.73m*2 Final    Calculations of estimated GFR are performed using the 2021 CKD-EPI Study Refit equation without the race variable for the IDMS-Traceable creatinine methods.  https://jasn.asnjournals.org/content/early/2021/09/22/ASN.5500668202    Calcium 04/17/2025 8.5 (L)  8.6 - 10.3 mg/dL Final    Phosphorus 04/17/2025 2.3 (L)  2.5 - 4.9 mg/dL Final    The performance characteristics of phosphorus testing in heparinized plasma have been validated by the individual  laboratory site where testing is performed. Testing on heparinized plasma is not approved by the FDA; however, such approval is not necessary.    Albumin 04/17/2025 3.5  3.4 - 5.0 g/dL Final    Extra Tube 04/17/2025 Hold for add-ons.   Final    Auto resulted.    Uric Acid 04/17/2025 6.4  4.0 - 7.5 mg/dL Final    Venipuncture immediately after or during the administration of Metamizole may lead to falsely low results. Testing should be performed immediately  prior to Metamizole dosing.    Magnesium 04/18/2025 1.56 (L)  1.60 - 2.40 mg/dL Final    Glucose 04/18/2025 160 (H)  74 - 99 mg/dL Final    Sodium 04/18/2025 139  136 - 145 mmol/L Final    Potassium 04/18/2025 4.0  3.5 - 5.3 mmol/L Final    MILD HEMOLYSIS DETECTED. The result may be falsely elevated due to hemolysis or other interferents. Clinical correlation is recommended. Repeat testing may be considered.    Chloride 04/18/2025 108 (H)  98 - 107 mmol/L Final    Bicarbonate 04/18/2025 22  21 - 32 mmol/L Final    Anion Gap 04/18/2025 13  10 - 20 mmol/L Final    Urea Nitrogen 04/18/2025 29 (H)  6 - 23 mg/dL Final    Creatinine 04/18/2025 1.26  0.50 - 1.30 mg/dL Final    eGFR 04/18/2025 60 (L)  >60 mL/min/1.73m*2 Final    Calculations of estimated GFR  are performed using the 2021 CKD-EPI Study Refit equation without the race variable for the IDMS-Traceable creatinine methods.  https://jasn.asnjournals.org/content/early/2021/09/22/ASN.5153107961    Calcium 04/18/2025 9.1  8.6 - 10.3 mg/dL Final    Extra Tube 04/18/2025 Hold for add-ons.   Final    Auto resulted.      Imaging  CT abdomen pelvis wo IV contrast  Result Date: 4/12/2025  Cholelithiasis. Evaluation of the gallbladder is otherwise limited, and if the patient is experiencing right upper quadrant pain or there is otherwise concern for acute gallbladder pathology, ultrasound may be obtained for further evaluation.   Postsurgical change of prior bowel resection and right side ostomy. Large left inguinal hernia with herniation of multiple bowel loops. No evidence of bowel obstruction.   Large 4.4 cm calculus in the urinary bladder.   MACRO: None   Signed by: Parker Cooper 4/12/2025 6:26 PM Dictation workstation:   [id number]      Cardiology, Vascular, and Other Imaging  ECG 12 lead  Result Date: 4/16/2025  Normal sinus rhythm Left axis deviation Minimal voltage criteria for LVH, may be normal variant ( R in aVL ) Cannot rule out Anterior infarct , age undetermined Abnormal ECG No previous ECGs available See ED provider note for full interpretation and clinical correlation Confirmed by Xi Strauss ([id number]) on 4/16/2025 6:06:20 PM          ASSESSMENT AND PLAN    74 y.o. male with PMH of Crohn disease status post ileostomy, BPH presenting with increased output from his ileostomy , found to have severe ALFRED     ALFRED on CKD, baseline creatinine of 1.3, was 5.88 on admission, resolved  Prerenal from high stoma output w/ s/o hemoconcentration on presentation     K stable  Mild HCa in setting of ALFRED  AGMA and NAGMA from gi losses     Hx of HTN here BP borderline low        Plan  - resolved ALFRED, K stable, resolved Hca  - noted high CRP, ESR, rotavirus+  - large bladder calculus, continue daily flomax  - R knee  not inflamed, tylenol and lidocaine TD, uric acid wnl  - noted iv phos, always>2.2, encourage skim milk  - placement in progress, fup in 1-2 weeks with dr Garica, will arrange            Thank you for the opportunity to assist in the care of this patient, please call with questions  Carley Garcia MD PhD

## 2025-04-19 VITALS
HEART RATE: 82 BPM | DIASTOLIC BLOOD PRESSURE: 64 MMHG | SYSTOLIC BLOOD PRESSURE: 98 MMHG | OXYGEN SATURATION: 97 % | TEMPERATURE: 98.1 F | BODY MASS INDEX: 29.26 KG/M2 | RESPIRATION RATE: 16 BRPM | HEIGHT: 71 IN | WEIGHT: 209 LBS

## 2025-04-19 LAB
ANION GAP SERPL CALC-SCNC: 11 MMOL/L (ref 10–20)
BUN SERPL-MCNC: 29 MG/DL (ref 6–23)
CALCIUM SERPL-MCNC: 9.2 MG/DL (ref 8.6–10.3)
CHLORIDE SERPL-SCNC: 107 MMOL/L (ref 98–107)
CO2 SERPL-SCNC: 26 MMOL/L (ref 21–32)
CREAT SERPL-MCNC: 1.47 MG/DL (ref 0.5–1.3)
EGFRCR SERPLBLD CKD-EPI 2021: 50 ML/MIN/1.73M*2
GLUCOSE SERPL-MCNC: 88 MG/DL (ref 74–99)
MAGNESIUM SERPL-MCNC: 1.94 MG/DL (ref 1.6–2.4)
PHOSPHATE SERPL-MCNC: 2.9 MG/DL (ref 2.5–4.9)
POTASSIUM SERPL-SCNC: 4.4 MMOL/L (ref 3.5–5.3)
SODIUM SERPL-SCNC: 140 MMOL/L (ref 136–145)

## 2025-04-19 PROCEDURE — 2500000005 HC RX 250 GENERAL PHARMACY W/O HCPCS: Performed by: INTERNAL MEDICINE

## 2025-04-19 PROCEDURE — 2500000002 HC RX 250 W HCPCS SELF ADMINISTERED DRUGS (ALT 637 FOR MEDICARE OP, ALT 636 FOR OP/ED): Performed by: INTERNAL MEDICINE

## 2025-04-19 PROCEDURE — 99231 SBSQ HOSP IP/OBS SF/LOW 25: CPT | Performed by: STUDENT IN AN ORGANIZED HEALTH CARE EDUCATION/TRAINING PROGRAM

## 2025-04-19 PROCEDURE — 2500000002 HC RX 250 W HCPCS SELF ADMINISTERED DRUGS (ALT 637 FOR MEDICARE OP, ALT 636 FOR OP/ED): Performed by: FAMILY MEDICINE

## 2025-04-19 PROCEDURE — 80048 BASIC METABOLIC PNL TOTAL CA: CPT | Performed by: STUDENT IN AN ORGANIZED HEALTH CARE EDUCATION/TRAINING PROGRAM

## 2025-04-19 PROCEDURE — 83735 ASSAY OF MAGNESIUM: CPT | Performed by: STUDENT IN AN ORGANIZED HEALTH CARE EDUCATION/TRAINING PROGRAM

## 2025-04-19 PROCEDURE — 2500000001 HC RX 250 WO HCPCS SELF ADMINISTERED DRUGS (ALT 637 FOR MEDICARE OP): Performed by: INTERNAL MEDICINE

## 2025-04-19 PROCEDURE — 84100 ASSAY OF PHOSPHORUS: CPT | Performed by: STUDENT IN AN ORGANIZED HEALTH CARE EDUCATION/TRAINING PROGRAM

## 2025-04-19 PROCEDURE — 2500000004 HC RX 250 GENERAL PHARMACY W/ HCPCS (ALT 636 FOR OP/ED): Performed by: INTERNAL MEDICINE

## 2025-04-19 PROCEDURE — 36415 COLL VENOUS BLD VENIPUNCTURE: CPT | Performed by: STUDENT IN AN ORGANIZED HEALTH CARE EDUCATION/TRAINING PROGRAM

## 2025-04-19 RX ADMIN — TAMSULOSIN HYDROCHLORIDE 0.4 MG: 0.4 CAPSULE ORAL at 09:17

## 2025-04-19 RX ADMIN — ARIPIPRAZOLE 2 MG: 2 TABLET ORAL at 09:17

## 2025-04-19 RX ADMIN — PANTOPRAZOLE SODIUM 40 MG: 40 TABLET, DELAYED RELEASE ORAL at 06:26

## 2025-04-19 RX ADMIN — LIDOCAINE 1 PATCH: 4 PATCH TOPICAL at 09:17

## 2025-04-19 RX ADMIN — HEPARIN SODIUM 5000 UNITS: 5000 INJECTION INTRAVENOUS; SUBCUTANEOUS at 06:26

## 2025-04-19 RX ADMIN — PAROXETINE HYDROCHLORIDE 40 MG: 20 TABLET, FILM COATED ORAL at 09:17

## 2025-04-19 NOTE — CARE PLAN
The patient's goals for the shift include      The clinical goals for the shift include patient will increase activity.    Over the shift, the patient did not make progress toward the following goals. Barriers to progression include . Recommendations to address these barriers include .

## 2025-04-19 NOTE — CARE PLAN
The patient's goals for the shift include      The clinical goals for the shift include Patient will remain safe throughout the shift    Over the shift, the patient did not make progress toward the following goals. Barriers to progression include . Recommendations to address these barriers include .

## 2025-04-19 NOTE — PROGRESS NOTES
Antoine Kimball is a 74 y.o. male on day 7 of admission presenting with ALFRED (acute kidney injury).      Subjective   No acute complaints, pt discharged to SNF, pending ins auth.        Objective     Last Recorded Vitals  /61 (BP Location: Right arm, Patient Position: Lying)   Pulse 66   Temp 36.6 °C (97.9 °F) (Temporal)   Resp 16   Wt 94.8 kg (209 lb)   SpO2 93%   Intake/Output last 3 Shifts:    Intake/Output Summary (Last 24 hours) at 4/19/2025 1034  Last data filed at 4/19/2025 0600  Gross per 24 hour   Intake 488.33 ml   Output 1600 ml   Net -1111.67 ml       Admission Weight  Weight: 68 kg (150 lb) (04/12/25 1558)    Daily Weight  04/12/25 : 94.8 kg (209 lb)    Image Results  ECG 12 lead  Normal sinus rhythm  Left axis deviation  Minimal voltage criteria for LVH, may be normal variant ( R in aVL )  Cannot rule out Anterior infarct , age undetermined  Abnormal ECG  No previous ECGs available  See ED provider note for full interpretation and clinical correlation  Confirmed by Xi Strauss ([id number]) on 4/16/2025 6:06:20 PM      Physical Exam  General Appearance: awake and alert, AAOx3, NAD  HEENT: nc/at, eomi, perrla, moist mucous membranes  Resp: ctab; no wheezing, rhonchi, or rales, normal respiratory effort  Cardio: rrr. S1s2  GI: soft, ntnd, BS+  Ext: no edema, 2+ pulses b/l      Relevant Results                              Assessment & Plan    Hypovolemia  Prerenal ALFRED w/metabolic acidosis  SIRS  H/o Crohns s/p ileostomy     Pt clinically improved and stable, labs normalized  Hemodynamically stable  PT/OT  Discharged to SNF, pending ins auth           Marco A Patricia MD

## 2025-04-21 NOTE — PROGRESS NOTES
Antoine Kimball is a 74 y.o. male on day 3 of admission presenting with ALFRED (acute kidney injury).      Subjective          Objective     Last Recorded Vitals  BP 84/50 (BP Location: Right arm, Patient Position: Lying)   Pulse 63   Temp 36.6 °C (97.9 °F) (Temporal)   Resp 18   Wt 94.8 kg (209 lb)   SpO2 94%   Intake/Output last 3 Shifts:    Intake/Output Summary (Last 24 hours) at 4/15/2025 1644  Last data filed at 4/15/2025 1640  Gross per 24 hour   Intake 2478.75 ml   Output 1350 ml   Net 1128.75 ml       Admission Weight  Weight: 68 kg (150 lb) (04/12/25 1558)    Daily Weight  04/12/25 : 94.8 kg (209 lb)    Image Results  ECG 12 lead  Normal sinus rhythm  Left axis deviation  Minimal voltage criteria for LVH, may be normal variant ( R in aVL )  Cannot rule out Anterior infarct , age undetermined  Abnormal ECG  No previous ECGs available      Physical Exam  Vitals reviewed.   Constitutional:       Appearance: Normal appearance.   HENT:      Head: Normocephalic and atraumatic.      Mouth/Throat:      Mouth: Mucous membranes are moist.   Eyes:      Conjunctiva/sclera: Conjunctivae normal.   Cardiovascular:      Rate and Rhythm: Normal rate.      Pulses: Normal pulses.   Pulmonary:      Effort: Pulmonary effort is normal.      Breath sounds: Normal breath sounds. No wheezing.   Abdominal:      General: Abdomen is flat. There is no distension.      Palpations: Abdomen is soft.      Tenderness: There is no guarding.   Musculoskeletal:         General: No swelling. Normal range of motion.   Skin:     General: Skin is warm and dry.   Neurological:      General: No focal deficit present.      Mental Status: He is alert. Mental status is at baseline.   Psychiatric:         Mood and Affect: Mood normal.         Behavior: Behavior normal.         Relevant Results               Assessment/Plan   This patient currently has cardiac telemetry ordered; if you would like to modify or discontinue the telemetry order, click here  [facility] Progress Note    Date: 2025    Name: Viki Pabon    MRN: [id number]         : 1959    Ms. Pabon arrived in the [facility] today at 1140 in stable condition, here for her XOLAIR INJECTIONS (EVERY 2 WEEKS). She was assessed and education was provided.     Ms. Pabon's vitals were reviewed.  Vitals:    25 1143   BP: (!) 150/84   Pulse: 64   Resp: 17   Temp: 98.2 °F (36.8 °C)   SpO2: 99%     Pt stated that she has been tolerating the XOLAIR injections well and without any problems.    Also, she denied being on any current antibiotic therapy, and denied having any current signs of infection.       Omalizumab (XOLAIR) 225 mg TOTAL DOSE was administered in 2 divided SQ injections as follows:  150 mg SQ in the back of her LEFT ARM   75 mg SQ in the back of her RIGHT ARM.       Ms. Pabon tolerated well and voiced no complaints.     [facility] condition at 1155.     She is to return in 2 weeks on 25 at 1100 for her next XOLAIR injection.     Regi Barrow RN  2025  11:47 AM        to go to the orders activity to modify/discontinue the order.              Assessment & Plan  ALFRED (acute kidney injury)    Hypovolemia      # Hypovolemia  # Acute renal failure, prerenal likely ATN  # Metabolic acidosis 2/2 renal failure  # Diarrhea  # SIRS without sepsis  # Large bladder calculus  # H/o Crohn disease s/p ileostomy  # BPH     Plan:  Rotavirus - bowel movements decreasing and renal function continues to improve.  Appreciate nephro input  Reviewed urology recommendations, the patient will follow-up with his established urologist, no indication for urgent urologic intervention  Monitor electrolytes  DVT prophylaxis     Aaron Arizmendi MD

## 2025-04-22 PROCEDURE — 93005 ELECTROCARDIOGRAM TRACING: CPT

## 2025-04-23 LAB
ATRIAL RATE: 85 BPM
P AXIS: 52 DEGREES
P OFFSET: 178 MS
P ONSET: 124 MS
PR INTERVAL: 166 MS
Q ONSET: 207 MS
QRS COUNT: 14 BEATS
QRS DURATION: 112 MS
QT INTERVAL: 378 MS
QTC CALCULATION(BAZETT): 449 MS
QTC FREDERICIA: 424 MS
R AXIS: -34 DEGREES
T AXIS: -4 DEGREES
T OFFSET: 396 MS
VENTRICULAR RATE: 85 BPM

## 2025-04-24 ENCOUNTER — NURSING HOME VISIT (OUTPATIENT)
Dept: POST ACUTE CARE | Facility: EXTERNAL LOCATION | Age: 75
End: 2025-04-24
Payer: MEDICARE

## 2025-04-24 DIAGNOSIS — E86.1 HYPOVOLEMIA: ICD-10-CM

## 2025-04-24 DIAGNOSIS — R19.7 DIARRHEA, UNSPECIFIED TYPE: ICD-10-CM

## 2025-04-24 DIAGNOSIS — R65.10 SIRS (SYSTEMIC INFLAMMATORY RESPONSE SYNDROME) (MULTI): ICD-10-CM

## 2025-04-24 DIAGNOSIS — N17.9 AKI (ACUTE KIDNEY INJURY): Primary | ICD-10-CM

## 2025-04-24 DIAGNOSIS — E87.20 METABOLIC ACIDOSIS: ICD-10-CM

## 2025-04-24 DIAGNOSIS — N21.0 BLADDER CALCULUS: ICD-10-CM

## 2025-04-24 PROCEDURE — 99497 ADVNCD CARE PLAN 30 MIN: CPT | Performed by: EMERGENCY MEDICINE

## 2025-04-24 PROCEDURE — 99305 1ST NF CARE MODERATE MDM 35: CPT | Performed by: EMERGENCY MEDICINE

## 2025-04-24 NOTE — PROGRESS NOTES
Antoine Kimball   74 y.o.  male  @location@            Assessment and Plan:  History and physical    ALFRED (acute kidney injury)     Hypovolemia     # Hypovolemia  # Acute renal failure, prerenal likely ATN  # Metabolic acidosis 2/2 renal failure  # Diarrhea  # SIRS without sepsis  # Large bladder calculus     H/o Crohn disease s/p ileostomy  BPH     Admit and treat patient for acute renal, previous creatinine was 1.34.  I suspect acute renal failure is prerenal due to hypovolemia from increased output from his ileostomy.  CT abdomen pelvis reviewed, this showed cholelithiasis without evidence for acute cholecystitis.  There is a large 4.4 cm calculus in the urinary bladder.  Metabolic acidosis secondary to acute renal failure, venous blood gas obtained and his pH is 7.37.  Will administer lactated Ringer's @125ml/hr, monitor strict intake and output, trend his renal function and electrolytes.  Will place consult to nephrology for additional recommendations.  Patient meets SIRS criteria, however I believe this is from hypovolemia and dehydration.  Will monitor for any fevers and if concerns for infection increases will start renally dosed Zosyn.  There as a very large bladder calculus 44mm.  Will consult urology if anything needs to be done, if it could be obstructing urinary outflow.  DVT prophylaxis with heparin subcutaneous.    Hospital Course  Antoine Kimball is a 74 y.o. male PMHx Crohn disease status post ileostomy, BPH presented to [facility] emergency department for increased output from his ileostomy present for last couple days. Patient found with ALFRED and rotavirus infection.  Treated with IV fluids and ALFRED resolved.  Bowel function also returned to normal. Patient to follow up with PCP, nephrology and his usual urologist at [facility]    I discussed advanced care planning for more than 16 minutes including the explanation and discussion of advanced directives. Information and advise was also provided on DO NOT  RESUSCITATE and patient encouraged to consider this  Patient is not sure about DNR at this time.      Source of history: Nurse, Medical personnel, Medical record, Patient.  History limitation: None.    HPI:  History and physical    Patient is unable to give any detailed history and therefore history is obtained from the chart  No acute complaints voiced by the patient or acute concerns raised by nursing    History of hospitalization-    Antoine Kimball is a 74 y.o. male PMHx Crohn disease status post ileostomy, BPH presented to [facility] emergency department for increased output from his ileostomy present for last couple days.  The output from his ileostomy is watery and copious, he has had empty his bag several times throughout the days.  He also admits to poor oral intake during this time and little to no urine output.  Denies any abdominal pain, fevers, chills, chest pain, palpitations, shortness of breath.  In the emergency department he is hypotensive blood pressure 86/46 and he is tachycardic heart rate 107 along with tachypnea respiratory rate 24.  Blood work significant for BUN 71, creatinine 5.88, bicarb 13.  WBC 15,000.  CT abdomen pelvis without contrast was ordered.     Past Medical History  Medical History        Past Medical History:   Diagnosis Date    Personal history of other diseases of the digestive system 08/31/2020     History of Crohn's disease            Surgical History  Surgical History         Past Surgical History:   Procedure Laterality Date    OTHER SURGICAL HISTORY   08/26/2020     Pilonidal cyst removal    OTHER SURGICAL HISTORY   08/31/2020     Ileostomy            Social History  He has no history on file for tobacco use, alcohol use, and drug use.     Family History  Family History   No family history on file.        Allergies  Patient has no known allergies.     Current Medications[1]    Physical Exam:  Vital signs as per nursing/MA documentation were reviewed  General appearance:  Alert and in no acute distress  HEENT: Normal Inspection  Neck - Normal Inspection  Respiratory : No respiratory distress. Lungs are clear   Cardiovascular: heart rate normal. No gallop  Back - normal inspection  Skin inspection:Warm  Musculoskeletal : No deformities  Neuro : Limited exam. Baseline    ROS: Review of symptoms is negative except for what is mentioned in HPI    Results/Data  Records including but not limited to electronic medical records, relevant lab work and imaging from patient's health care facility encounter were reviewed and independently verified      Charting was completed using voice recognition technology and may include unintended errors.    Discussed with patient/family, RN, and NP.       [1]   Current Outpatient Medications   Medication Sig Dispense Refill    ARIPiprazole (Abilify) 2 mg tablet Take 1 tablet (2 mg) by mouth once daily.      cyanocobalamin, vitamin B-12, 2,500 mcg tablet, sublingual SL tablet Place 1 tablet (2,500 mcg) under the tongue 2 times a week. Every Wednesday and Friday      ergocalciferol (Vitamin D-2) 1250 mcg (50,000 units) capsule Take 1 capsule (1.25 mg) by mouth 2 times a week. Every Thursday and Sunday      mirtazapine (Remeron) 15 mg tablet Take 1 tablet (15 mg) by mouth once daily at bedtime.      multivit-min/folic acid/lutein (CENTRUM SILVER ORAL) Take 1 tablet by mouth once daily.      omeprazole (PriLOSEC) 20 mg DR capsule Take 1 capsule (20 mg) by mouth once daily at bedtime.      PARoxetine (Paxil) 40 mg tablet Take 1 tablet (40 mg) by mouth once daily in the morning.      tamsulosin (Flomax) 0.4 mg 24 hr capsule Take 1 capsule (0.4 mg) by mouth once daily in the morning.       No current facility-administered medications for this visit.

## 2025-04-24 NOTE — LETTER
Patient: Antoine Kimball  : 1950    Encounter Date: 2025    Antoine Kimball   74 y.o.  male  @location@            Assessment and Plan:  History and physical    ALFRED (acute kidney injury)     Hypovolemia     # Hypovolemia  # Acute renal failure, prerenal likely ATN  # Metabolic acidosis 2/2 renal failure  # Diarrhea  # SIRS without sepsis  # Large bladder calculus     H/o Crohn disease s/p ileostomy  BPH     Admit and treat patient for acute renal, previous creatinine was 1.34.  I suspect acute renal failure is prerenal due to hypovolemia from increased output from his ileostomy.  CT abdomen pelvis reviewed, this showed cholelithiasis without evidence for acute cholecystitis.  There is a large 4.4 cm calculus in the urinary bladder.  Metabolic acidosis secondary to acute renal failure, venous blood gas obtained and his pH is 7.37.  Will administer lactated Ringer's @125ml/hr, monitor strict intake and output, trend his renal function and electrolytes.  Will place consult to nephrology for additional recommendations.  Patient meets SIRS criteria, however I believe this is from hypovolemia and dehydration.  Will monitor for any fevers and if concerns for infection increases will start renally dosed Zosyn.  There as a very large bladder calculus 44mm.  Will consult urology if anything needs to be done, if it could be obstructing urinary outflow.  DVT prophylaxis with heparin subcutaneous.    Hospital Course  Antoine Kimball is a 74 y.o. male PMHx Crohn disease status post ileostomy, BPH presented to [facility] emergency department for increased output from his ileostomy present for last couple days. Patient found with ALFRED and rotavirus infection.  Treated with IV fluids and ALFRED resolved.  Bowel function also returned to normal. Patient to follow up with PCP, nephrology and his usual urologist at [facility]    I discussed advanced care planning for more than 16 minutes including the explanation and discussion of advanced  directives. Information and advise was also provided on DO NOT RESUSCITATE and patient encouraged to consider this  Patient is not sure about DNR at this time.      Source of history: Nurse, Medical personnel, Medical record, Patient.  History limitation: None.    HPI:  History and physical    Patient is unable to give any detailed history and therefore history is obtained from the chart  No acute complaints voiced by the patient or acute concerns raised by nursing    History of hospitalization-    Antoine Kimball is a 74 y.o. male PMHx Crohn disease status post ileostomy, BPH presented to [facility] emergency department for increased output from his ileostomy present for last couple days.  The output from his ileostomy is watery and copious, he has had empty his bag several times throughout the days.  He also admits to poor oral intake during this time and little to no urine output.  Denies any abdominal pain, fevers, chills, chest pain, palpitations, shortness of breath.  In the emergency department he is hypotensive blood pressure 86/46 and he is tachycardic heart rate 107 along with tachypnea respiratory rate 24.  Blood work significant for BUN 71, creatinine 5.88, bicarb 13.  WBC 15,000.  CT abdomen pelvis without contrast was ordered.     Past Medical History  Medical History        Past Medical History:   Diagnosis Date   • Personal history of other diseases of the digestive system 08/31/2020     History of Crohn's disease            Surgical History  Surgical History         Past Surgical History:   Procedure Laterality Date   • OTHER SURGICAL HISTORY   08/26/2020     Pilonidal cyst removal   • OTHER SURGICAL HISTORY   08/31/2020     Ileostomy            Social History  He has no history on file for tobacco use, alcohol use, and drug use.     Family History  Family History   No family history on file.        Allergies  Patient has no known allergies.     Current Medications[1]    Physical Exam:  Vital signs as  per nursing/MA documentation were reviewed  General appearance: Alert and in no acute distress  HEENT: Normal Inspection  Neck - Normal Inspection  Respiratory : No respiratory distress. Lungs are clear   Cardiovascular: heart rate normal. No gallop  Back - normal inspection  Skin inspection:Warm  Musculoskeletal : No deformities  Neuro : Limited exam. Baseline    ROS: Review of symptoms is negative except for what is mentioned in HPI    Results/Data  Records including but not limited to electronic medical records, relevant lab work and imaging from patient's health care facility encounter were reviewed and independently verified      Charting was completed using voice recognition technology and may include unintended errors.    Discussed with patient/family, RN, and NP.      Electronically Signed By: Low Skinner MD   4/24/25  5:27 PM       [1]  Current Outpatient Medications   Medication Sig Dispense Refill   • ARIPiprazole (Abilify) 2 mg tablet Take 1 tablet (2 mg) by mouth once daily.     • cyanocobalamin, vitamin B-12, 2,500 mcg tablet, sublingual SL tablet Place 1 tablet (2,500 mcg) under the tongue 2 times a week. Every Wednesday and Friday     • ergocalciferol (Vitamin D-2) 1250 mcg (50,000 units) capsule Take 1 capsule (1.25 mg) by mouth 2 times a week. Every Thursday and Sunday     • mirtazapine (Remeron) 15 mg tablet Take 1 tablet (15 mg) by mouth once daily at bedtime.     • multivit-min/folic acid/lutein (CENTRUM SILVER ORAL) Take 1 tablet by mouth once daily.     • omeprazole (PriLOSEC) 20 mg DR capsule Take 1 capsule (20 mg) by mouth once daily at bedtime.     • PARoxetine (Paxil) 40 mg tablet Take 1 tablet (40 mg) by mouth once daily in the morning.     • tamsulosin (Flomax) 0.4 mg 24 hr capsule Take 1 capsule (0.4 mg) by mouth once daily in the morning.       No current facility-administered medications for this visit.

## 2025-04-29 NOTE — DOCUMENTATION CLARIFICATION NOTE
"    PATIENT:               JUSTIN AARON  ACCT #:                  [id number]  MRN:                       [id number]  :                       1950  ADMIT DATE:       2025 3:51 PM  DISCH DATE:        2025 2:16 PM  RESPONDING PROVIDER #:        [id number]          PROVIDER RESPONSE TEXT:    Non infections SIRS due to hypovolemia with acute circulatory organ dysfunction of hypotension    CDI QUERY TEXT:    Clarification        Instruction:    Based on your assessment of the patient and the clinical information, please provide the requested documentation by clicking on the appropriate radio button and enter any additional information if prompted.    Question: Please further clarify if there is a diagnosis related to the clinical information    When answering this query, please exercise your independent professional judgment. The fact that a question is being asked, does not imply that any particular answer is desired or expected.    The patient's clinical indicators include:  Clinical Information:  84 y.o. male PMHx Crohn disease status post ileostomy, BPH presented to [facility] emergency department for increased output from his ileostomy    Clinical Indicators:  : H and P:  \"SIRS without sepsis\"    \"In the emergency department he is hypotensive blood pressure 86/46 and he is tachycardic heart rate 107 along with tachypnea respiratory rate 24.\"    \"Hypovolemia  Hypovolemia\"      Blood Pressure:  4/15 1132:  86/54  4/15 1627:  84/50  4/15 1955:  90/52  4/15 2353:  93/55    Treatment:  : Lactated Ringer's bolus 1,000 mL x2    Lactated Ringer's infusion  : Normal saline infusion at 75 mL/hr  4/15: Normal saline infusion at 100 mL/hr    Risk Factors:  SIRS, Dehydration, Hypotension, Hypovolemia  Options provided:  -- Non infections SIRS due to hypovolemia with acute circulatory organ dysfunction of hypotension  -- Non infections SIRS due to hypovolemia without organ dysfunction  -- Other - I will " add my own diagnosis  -- Refer to Clinical Documentation Reviewer    Query created by: Lise Pan on 4/23/2025 3:30 PM      Electronically signed by:  SANTIAGO SIMON MD 4/29/2025 8:13 AM

## 2025-05-13 ENCOUNTER — NURSING HOME VISIT (OUTPATIENT)
Dept: POST ACUTE CARE | Facility: EXTERNAL LOCATION | Age: 75
End: 2025-05-13
Payer: MEDICARE

## 2025-05-13 DIAGNOSIS — E87.20 METABOLIC ACIDOSIS: ICD-10-CM

## 2025-05-13 DIAGNOSIS — N17.9 AKI (ACUTE KIDNEY INJURY): ICD-10-CM

## 2025-05-13 DIAGNOSIS — N21.0 BLADDER CALCULUS: ICD-10-CM

## 2025-05-13 DIAGNOSIS — R65.10 SIRS (SYSTEMIC INFLAMMATORY RESPONSE SYNDROME) (MULTI): Primary | ICD-10-CM

## 2025-05-13 PROCEDURE — 99309 SBSQ NF CARE MODERATE MDM 30: CPT | Performed by: EMERGENCY MEDICINE

## 2025-05-13 NOTE — PROGRESS NOTES
Antoine Kimball   74 y.o.  male  @location@            Assessment and Plan:      ALFRED (acute kidney injury)     Hypovolemia     # Hypovolemia  # Acute renal failure, prerenal likely ATN  # Metabolic acidosis 2/2 renal failure  # Diarrhea  # SIRS without sepsis  # Large bladder calculus     H/o Crohn disease s/p ileostomy  BPH     Admit and treat patient for acute renal, previous creatinine was 1.34.  I suspect acute renal failure is prerenal due to hypovolemia from increased output from his ileostomy.  CT abdomen pelvis reviewed, this showed cholelithiasis without evidence for acute cholecystitis.  There is a large 4.4 cm calculus in the urinary bladder.  Metabolic acidosis secondary to acute renal failure, venous blood gas obtained and his pH is 7.37.  Will administer lactated Ringer's @125ml/hr, monitor strict intake and output, trend his renal function and electrolytes.  Will place consult to nephrology for additional recommendations.  Patient meets SIRS criteria, however I believe this is from hypovolemia and dehydration.  Will monitor for any fevers and if concerns for infection increases will start renally dosed Zosyn.  There as a very large bladder calculus 44mm.  Will consult urology if anything needs to be done, if it could be obstructing urinary outflow.  DVT prophylaxis with heparin subcutaneous.    Hospital Course  Antoine Kimball is a 74 y.o. male PMHx Crohn disease status post ileostomy, BPH presented to [facility] emergency department for increased output from his ileostomy present for last couple days. Patient found with ALFRED and rotavirus infection.  Treated with IV fluids and ALFRED resolved.  Bowel function also returned to normal. Patient to follow up with PCP, nephrology and his usual urologist at [facility]    -Fall prevention    -Cognitive engagement     -Monitor and treat blood pressure     -Aggressive decubitus ulcer prevention.     -Bowel and bladder care     -Optimal nutrition and supplementation as needed      -GI  and DVT prophylaxis     -Symptom control     -Ambulation as tolerated     -Will follow    Charting is done using voice recognition software and may contain errors which have not been completely corrected        Source of history: Nurse, Medical personnel, Medical record, Patient.  History limitation: None.    HPI:      Patient is unable to give any detailed history and therefore history is obtained from the chart  No acute complaints voiced by the patient or acute concerns raised by nursing    History of hospitalization-    Antoine Kimball is a 74 y.o. male PMHx Crohn disease status post ileostomy, BPH presented to [facility] emergency department for increased output from his ileostomy present for last couple days.  The output from his ileostomy is watery and copious, he has had empty his bag several times throughout the days.  He also admits to poor oral intake during this time and little to no urine output.  Denies any abdominal pain, fevers, chills, chest pain, palpitations, shortness of breath.  In the emergency department he is hypotensive blood pressure 86/46 and he is tachycardic heart rate 107 along with tachypnea respiratory rate 24.  Blood work significant for BUN 71, creatinine 5.88, bicarb 13.  WBC 15,000.  CT abdomen pelvis without contrast was ordered.     Past Medical History  Medical History        Past Medical History:   Diagnosis Date    Personal history of other diseases of the digestive system 08/31/2020     History of Crohn's disease            Surgical History  Surgical History         Past Surgical History:   Procedure Laterality Date    OTHER SURGICAL HISTORY   08/26/2020     Pilonidal cyst removal    OTHER SURGICAL HISTORY   08/31/2020     Ileostomy            Social History  He has no history on file for tobacco use, alcohol use, and drug use.     Family History  Family History   No family history on file.        Allergies  Patient has no known allergies.     Current  Medications[1]    Physical Exam:  Vital signs as per nursing/MA documentation were reviewed  General appearance: Alert and in no acute distress  HEENT: Normal Inspection  Neck - Normal Inspection  Respiratory : No respiratory distress. Lungs are clear   Cardiovascular: heart rate normal. No gallop  Back - normal inspection  Skin inspection:Warm  Musculoskeletal : No deformities  Neuro : Limited exam. Baseline    ROS: Review of symptoms is negative except for what is mentioned in HPI    Results/Data  Records including but not limited to electronic medical records, relevant lab work and imaging from patient's health care facility encounter were reviewed and independently verified      Charting was completed using voice recognition technology and may include unintended errors.    Discussed with patient/family, RN, and NP.         [1]   Current Outpatient Medications   Medication Sig Dispense Refill    ARIPiprazole (Abilify) 2 mg tablet Take 1 tablet (2 mg) by mouth once daily.      cyanocobalamin, vitamin B-12, 2,500 mcg tablet, sublingual SL tablet Place 1 tablet (2,500 mcg) under the tongue 2 times a week. Every Wednesday and Friday      ergocalciferol (Vitamin D-2) 1250 mcg (50,000 units) capsule Take 1 capsule (1.25 mg) by mouth 2 times a week. Every Thursday and Sunday      mirtazapine (Remeron) 15 mg tablet Take 1 tablet (15 mg) by mouth once daily at bedtime.      multivit-min/folic acid/lutein (CENTRUM SILVER ORAL) Take 1 tablet by mouth once daily.      omeprazole (PriLOSEC) 20 mg DR capsule Take 1 capsule (20 mg) by mouth once daily at bedtime.      PARoxetine (Paxil) 40 mg tablet Take 1 tablet (40 mg) by mouth once daily in the morning.      tamsulosin (Flomax) 0.4 mg 24 hr capsule Take 1 capsule (0.4 mg) by mouth once daily in the morning.       No current facility-administered medications for this visit.

## 2025-05-13 NOTE — LETTER
Patient: Antoine Kimball  : 1950    Encounter Date: 2025    Antoine Kimball   74 y.o.  male  @location@            Assessment and Plan:      ALFRED (acute kidney injury)     Hypovolemia     # Hypovolemia  # Acute renal failure, prerenal likely ATN  # Metabolic acidosis 2/2 renal failure  # Diarrhea  # SIRS without sepsis  # Large bladder calculus     H/o Crohn disease s/p ileostomy  BPH     Admit and treat patient for acute renal, previous creatinine was 1.34.  I suspect acute renal failure is prerenal due to hypovolemia from increased output from his ileostomy.  CT abdomen pelvis reviewed, this showed cholelithiasis without evidence for acute cholecystitis.  There is a large 4.4 cm calculus in the urinary bladder.  Metabolic acidosis secondary to acute renal failure, venous blood gas obtained and his pH is 7.37.  Will administer lactated Ringer's @125ml/hr, monitor strict intake and output, trend his renal function and electrolytes.  Will place consult to nephrology for additional recommendations.  Patient meets SIRS criteria, however I believe this is from hypovolemia and dehydration.  Will monitor for any fevers and if concerns for infection increases will start renally dosed Zosyn.  There as a very large bladder calculus 44mm.  Will consult urology if anything needs to be done, if it could be obstructing urinary outflow.  DVT prophylaxis with heparin subcutaneous.    Hospital Course  Antoine Kimball is a 74 y.o. male PMHx Crohn disease status post ileostomy, BPH presented to [facility] emergency department for increased output from his ileostomy present for last couple days. Patient found with ALFRED and rotavirus infection.  Treated with IV fluids and ALFRED resolved.  Bowel function also returned to normal. Patient to follow up with PCP, nephrology and his usual urologist at [facility]    -Fall prevention    -Cognitive engagement     -Monitor and treat blood pressure     -Aggressive decubitus ulcer prevention.      -Bowel and bladder care     -Optimal nutrition and supplementation as needed     -GI  and DVT prophylaxis     -Symptom control     -Ambulation as tolerated     -Will follow    Charting is done using voice recognition software and may contain errors which have not been completely corrected        Source of history: Nurse, Medical personnel, Medical record, Patient.  History limitation: None.    HPI:      Patient is unable to give any detailed history and therefore history is obtained from the chart  No acute complaints voiced by the patient or acute concerns raised by nursing    History of hospitalization-    Antoine Kimball is a 74 y.o. male PMHx Crohn disease status post ileostomy, BPH presented to [facility] emergency department for increased output from his ileostomy present for last couple days.  The output from his ileostomy is watery and copious, he has had empty his bag several times throughout the days.  He also admits to poor oral intake during this time and little to no urine output.  Denies any abdominal pain, fevers, chills, chest pain, palpitations, shortness of breath.  In the emergency department he is hypotensive blood pressure 86/46 and he is tachycardic heart rate 107 along with tachypnea respiratory rate 24.  Blood work significant for BUN 71, creatinine 5.88, bicarb 13.  WBC 15,000.  CT abdomen pelvis without contrast was ordered.     Past Medical History  Medical History        Past Medical History:   Diagnosis Date   • Personal history of other diseases of the digestive system 08/31/2020     History of Crohn's disease            Surgical History  Surgical History         Past Surgical History:   Procedure Laterality Date   • OTHER SURGICAL HISTORY   08/26/2020     Pilonidal cyst removal   • OTHER SURGICAL HISTORY   08/31/2020     Ileostomy            Social History  He has no history on file for tobacco use, alcohol use, and drug use.     Family History  Family History   No family history on  file.        Allergies  Patient has no known allergies.     Current Medications[1]    Physical Exam:  Vital signs as per nursing/MA documentation were reviewed  General appearance: Alert and in no acute distress  HEENT: Normal Inspection  Neck - Normal Inspection  Respiratory : No respiratory distress. Lungs are clear   Cardiovascular: heart rate normal. No gallop  Back - normal inspection  Skin inspection:Warm  Musculoskeletal : No deformities  Neuro : Limited exam. Baseline    ROS: Review of symptoms is negative except for what is mentioned in HPI    Results/Data  Records including but not limited to electronic medical records, relevant lab work and imaging from patient's health care facility encounter were reviewed and independently verified      Charting was completed using voice recognition technology and may include unintended errors.    Discussed with patient/family, RN, and NP.        Electronically Signed By: Low Skinner MD   5/13/25  5:41 PM       [1]  Current Outpatient Medications   Medication Sig Dispense Refill   • ARIPiprazole (Abilify) 2 mg tablet Take 1 tablet (2 mg) by mouth once daily.     • cyanocobalamin, vitamin B-12, 2,500 mcg tablet, sublingual SL tablet Place 1 tablet (2,500 mcg) under the tongue 2 times a week. Every Wednesday and Friday     • ergocalciferol (Vitamin D-2) 1250 mcg (50,000 units) capsule Take 1 capsule (1.25 mg) by mouth 2 times a week. Every Thursday and Sunday     • mirtazapine (Remeron) 15 mg tablet Take 1 tablet (15 mg) by mouth once daily at bedtime.     • multivit-min/folic acid/lutein (CENTRUM SILVER ORAL) Take 1 tablet by mouth once daily.     • omeprazole (PriLOSEC) 20 mg DR capsule Take 1 capsule (20 mg) by mouth once daily at bedtime.     • PARoxetine (Paxil) 40 mg tablet Take 1 tablet (40 mg) by mouth once daily in the morning.     • tamsulosin (Flomax) 0.4 mg 24 hr capsule Take 1 capsule (0.4 mg) by mouth once daily in the morning.       No current  facility-administered medications for this visit.